# Patient Record
Sex: FEMALE | Race: WHITE | NOT HISPANIC OR LATINO | Employment: OTHER | URBAN - NONMETROPOLITAN AREA
[De-identification: names, ages, dates, MRNs, and addresses within clinical notes are randomized per-mention and may not be internally consistent; named-entity substitution may affect disease eponyms.]

---

## 2017-05-11 ENCOUNTER — OFFICE VISIT (OUTPATIENT)
Dept: GASTROENTEROLOGY | Facility: CLINIC | Age: 57
End: 2017-05-11

## 2017-05-11 VITALS
HEIGHT: 66 IN | SYSTOLIC BLOOD PRESSURE: 121 MMHG | WEIGHT: 121.6 LBS | DIASTOLIC BLOOD PRESSURE: 80 MMHG | BODY MASS INDEX: 19.54 KG/M2 | HEART RATE: 82 BPM

## 2017-05-11 DIAGNOSIS — B18.2 CHRONIC HEPATITIS C WITHOUT HEPATIC COMA (HCC): Primary | ICD-10-CM

## 2017-05-11 PROBLEM — E78.70: Status: ACTIVE | Noted: 2017-05-11

## 2017-05-11 PROBLEM — E87.20 ACIDOSIS: Status: ACTIVE | Noted: 2017-05-11

## 2017-05-11 PROCEDURE — 99203 OFFICE O/P NEW LOW 30 MIN: CPT | Performed by: INTERNAL MEDICINE

## 2017-05-11 RX ORDER — METHADONE HYDROCHLORIDE 10 MG/1
TABLET ORAL
Refills: 0 | COMMUNITY
Start: 2017-05-06 | End: 2017-12-05 | Stop reason: HOSPADM

## 2017-05-11 RX ORDER — LIDOCAINE HYDROCHLORIDE 10 MG/ML
0.1 INJECTION, SOLUTION EPIDURAL; INFILTRATION; INTRACAUDAL; PERINEURAL ONCE AS NEEDED
Status: CANCELLED | OUTPATIENT
Start: 2017-06-26

## 2017-05-11 RX ORDER — GABAPENTIN 600 MG/1
TABLET ORAL
Refills: 2 | COMMUNITY
Start: 2017-04-27 | End: 2017-12-05 | Stop reason: HOSPADM

## 2017-05-11 RX ORDER — DICYCLOMINE HYDROCHLORIDE 10 MG/1
CAPSULE ORAL
Refills: 0 | COMMUNITY
Start: 2017-04-06 | End: 2018-01-01 | Stop reason: SDUPTHER

## 2017-05-11 RX ORDER — SODIUM CHLORIDE 0.9 % (FLUSH) 0.9 %
1-10 SYRINGE (ML) INJECTION AS NEEDED
Status: CANCELLED | OUTPATIENT
Start: 2017-06-26

## 2017-05-11 RX ORDER — TIZANIDINE 4 MG/1
TABLET ORAL
Refills: 2 | COMMUNITY
Start: 2017-03-11 | End: 2018-01-01 | Stop reason: HOSPADM

## 2017-05-11 RX ORDER — SODIUM, POTASSIUM,MAG SULFATES 17.5-3.13G
1 SOLUTION, RECONSTITUTED, ORAL ORAL EVERY 12 HOURS
Qty: 1 BOTTLE | Refills: 0 | Status: ON HOLD | OUTPATIENT
Start: 2017-05-11 | End: 2017-06-26

## 2017-05-11 RX ORDER — PROMETHAZINE HYDROCHLORIDE 25 MG/1
TABLET ORAL
Refills: 0 | COMMUNITY
Start: 2017-04-06 | End: 2018-01-01

## 2017-05-11 RX ORDER — CLONAZEPAM 0.5 MG/1
TABLET ORAL
Refills: 0 | COMMUNITY
Start: 2017-04-22 | End: 2017-12-05 | Stop reason: HOSPADM

## 2017-05-11 RX ORDER — DEXTROSE AND SODIUM CHLORIDE 5; .45 G/100ML; G/100ML
30 INJECTION, SOLUTION INTRAVENOUS CONTINUOUS PRN
Status: CANCELLED | OUTPATIENT
Start: 2017-06-26

## 2017-05-11 RX ORDER — RANITIDINE 150 MG/1
TABLET ORAL
Refills: 5 | COMMUNITY
Start: 2017-02-07 | End: 2017-06-19

## 2017-05-15 ENCOUNTER — TELEPHONE (OUTPATIENT)
Dept: CARDIOLOGY | Facility: CLINIC | Age: 57
End: 2017-05-15

## 2017-05-19 ENCOUNTER — TELEPHONE (OUTPATIENT)
Dept: GASTROENTEROLOGY | Facility: CLINIC | Age: 57
End: 2017-05-19

## 2017-05-22 ENCOUNTER — TELEPHONE (OUTPATIENT)
Dept: CARDIOLOGY | Facility: CLINIC | Age: 57
End: 2017-05-22

## 2017-06-19 RX ORDER — ESOMEPRAZOLE MAGNESIUM 40 MG/1
40 CAPSULE, DELAYED RELEASE ORAL
COMMUNITY
End: 2018-01-01 | Stop reason: SDUPTHER

## 2017-06-26 ENCOUNTER — HOSPITAL ENCOUNTER (OUTPATIENT)
Facility: HOSPITAL | Age: 57
Setting detail: HOSPITAL OUTPATIENT SURGERY
Discharge: HOME | End: 2017-06-26
Attending: INTERNAL MEDICINE | Admitting: INTERNAL MEDICINE

## 2017-06-26 ENCOUNTER — ANESTHESIA (OUTPATIENT)
Dept: GASTROENTEROLOGY | Facility: HOSPITAL | Age: 57
End: 2017-06-26

## 2017-06-26 ENCOUNTER — ANESTHESIA EVENT (OUTPATIENT)
Dept: GASTROENTEROLOGY | Facility: HOSPITAL | Age: 57
End: 2017-06-26

## 2017-06-26 VITALS
HEIGHT: 66 IN | HEART RATE: 96 BPM | RESPIRATION RATE: 18 BRPM | OXYGEN SATURATION: 97 % | WEIGHT: 121.25 LBS | BODY MASS INDEX: 19.49 KG/M2 | TEMPERATURE: 97.6 F | SYSTOLIC BLOOD PRESSURE: 112 MMHG | DIASTOLIC BLOOD PRESSURE: 80 MMHG

## 2017-06-26 DIAGNOSIS — B18.2 CHRONIC HEPATITIS C WITHOUT HEPATIC COMA (HCC): ICD-10-CM

## 2017-06-26 PROCEDURE — G0121 COLON CA SCRN NOT HI RSK IND: HCPCS | Performed by: INTERNAL MEDICINE

## 2017-06-26 PROCEDURE — 25010000002 MIDAZOLAM PER 1 MG: Performed by: NURSE ANESTHETIST, CERTIFIED REGISTERED

## 2017-06-26 PROCEDURE — 25010000002 PROPOFOL 10 MG/ML EMULSION: Performed by: NURSE ANESTHETIST, CERTIFIED REGISTERED

## 2017-06-26 PROCEDURE — 25010000002 FENTANYL CITRATE (PF) 100 MCG/2ML SOLUTION: Performed by: NURSE ANESTHETIST, CERTIFIED REGISTERED

## 2017-06-26 PROCEDURE — 43239 EGD BIOPSY SINGLE/MULTIPLE: CPT | Performed by: INTERNAL MEDICINE

## 2017-06-26 PROCEDURE — 88305 TISSUE EXAM BY PATHOLOGIST: CPT | Performed by: PATHOLOGY

## 2017-06-26 PROCEDURE — 88305 TISSUE EXAM BY PATHOLOGIST: CPT | Performed by: INTERNAL MEDICINE

## 2017-06-26 RX ORDER — FENTANYL CITRATE 50 UG/ML
INJECTION, SOLUTION INTRAMUSCULAR; INTRAVENOUS AS NEEDED
Status: DISCONTINUED | OUTPATIENT
Start: 2017-06-26 | End: 2017-06-26 | Stop reason: SURG

## 2017-06-26 RX ORDER — PROMETHAZINE HYDROCHLORIDE 25 MG/ML
12.5 INJECTION, SOLUTION INTRAMUSCULAR; INTRAVENOUS ONCE AS NEEDED
Status: DISCONTINUED | OUTPATIENT
Start: 2017-06-26 | End: 2017-06-26 | Stop reason: HOSPADM

## 2017-06-26 RX ORDER — PROPOFOL 10 MG/ML
VIAL (ML) INTRAVENOUS AS NEEDED
Status: DISCONTINUED | OUTPATIENT
Start: 2017-06-26 | End: 2017-06-26 | Stop reason: SURG

## 2017-06-26 RX ORDER — PROMETHAZINE HYDROCHLORIDE 25 MG/1
25 TABLET ORAL ONCE AS NEEDED
Status: DISCONTINUED | OUTPATIENT
Start: 2017-06-26 | End: 2017-06-26 | Stop reason: HOSPADM

## 2017-06-26 RX ORDER — MIDAZOLAM HYDROCHLORIDE 1 MG/ML
INJECTION INTRAMUSCULAR; INTRAVENOUS AS NEEDED
Status: DISCONTINUED | OUTPATIENT
Start: 2017-06-26 | End: 2017-06-26 | Stop reason: SURG

## 2017-06-26 RX ORDER — DEXAMETHASONE SODIUM PHOSPHATE 4 MG/ML
8 INJECTION, SOLUTION INTRA-ARTICULAR; INTRALESIONAL; INTRAMUSCULAR; INTRAVENOUS; SOFT TISSUE ONCE AS NEEDED
Status: DISCONTINUED | OUTPATIENT
Start: 2017-06-26 | End: 2017-06-26 | Stop reason: HOSPADM

## 2017-06-26 RX ORDER — PROMETHAZINE HYDROCHLORIDE 25 MG/1
25 SUPPOSITORY RECTAL ONCE AS NEEDED
Status: DISCONTINUED | OUTPATIENT
Start: 2017-06-26 | End: 2017-06-26 | Stop reason: HOSPADM

## 2017-06-26 RX ORDER — LIDOCAINE HYDROCHLORIDE 10 MG/ML
INJECTION, SOLUTION INFILTRATION; PERINEURAL AS NEEDED
Status: DISCONTINUED | OUTPATIENT
Start: 2017-06-26 | End: 2017-06-26 | Stop reason: SURG

## 2017-06-26 RX ORDER — ONDANSETRON 2 MG/ML
4 INJECTION INTRAMUSCULAR; INTRAVENOUS ONCE AS NEEDED
Status: DISCONTINUED | OUTPATIENT
Start: 2017-06-26 | End: 2017-06-26 | Stop reason: HOSPADM

## 2017-06-26 RX ORDER — DEXTROSE AND SODIUM CHLORIDE 5; .45 G/100ML; G/100ML
30 INJECTION, SOLUTION INTRAVENOUS CONTINUOUS PRN
Status: DISCONTINUED | OUTPATIENT
Start: 2017-06-26 | End: 2017-06-26 | Stop reason: HOSPADM

## 2017-06-26 RX ADMIN — LIDOCAINE HYDROCHLORIDE 60 MG: 10 INJECTION, SOLUTION INFILTRATION; PERINEURAL at 13:37

## 2017-06-26 RX ADMIN — PROPOFOL 50 MG: 10 INJECTION, EMULSION INTRAVENOUS at 13:43

## 2017-06-26 RX ADMIN — PROPOFOL 100 MG: 10 INJECTION, EMULSION INTRAVENOUS at 13:40

## 2017-06-26 RX ADMIN — MIDAZOLAM 2 MG: 1 INJECTION INTRAMUSCULAR; INTRAVENOUS at 13:33

## 2017-06-26 RX ADMIN — FENTANYL CITRATE 25 MCG: 50 INJECTION, SOLUTION INTRAMUSCULAR; INTRAVENOUS at 13:44

## 2017-06-26 RX ADMIN — PROPOFOL 20 MG: 10 INJECTION, EMULSION INTRAVENOUS at 13:50

## 2017-06-26 RX ADMIN — FENTANYL CITRATE 50 MCG: 50 INJECTION, SOLUTION INTRAMUSCULAR; INTRAVENOUS at 13:36

## 2017-06-26 RX ADMIN — DEXTROSE AND SODIUM CHLORIDE 30 ML/HR: 5; 450 INJECTION, SOLUTION INTRAVENOUS at 13:28

## 2017-06-26 RX ADMIN — PROPOFOL 50 MG: 10 INJECTION, EMULSION INTRAVENOUS at 13:47

## 2017-06-26 RX ADMIN — FENTANYL CITRATE 25 MCG: 50 INJECTION, SOLUTION INTRAMUSCULAR; INTRAVENOUS at 13:50

## 2017-06-26 NOTE — PLAN OF CARE
Problem: Patient Care Overview (Adult)  Goal: Plan of Care Review  Outcome: Outcome(s) achieved Date Met:  06/26/17 06/26/17 1431   Coping/Psychosocial Response Interventions   Plan Of Care Reviewed With patient   Patient Care Overview   Progress no change   Outcome Evaluation   Outcome Summary/Follow up Plan ready for d/c         Problem: GI Endoscopy (Adult)  Goal: Signs and Symptoms of Listed Potential Problems Will be Absent or Manageable (GI Endoscopy)  Outcome: Outcome(s) achieved Date Met:  06/26/17 06/26/17 1431   GI Endoscopy   Problems Assessed (GI Endoscopy) all   Problems Present (GI Endoscopy) none

## 2017-06-26 NOTE — ANESTHESIA PREPROCEDURE EVALUATION
Anesthesia Evaluation     Patient summary reviewed and Nursing notes reviewed   NPO Solid Status: > 8 hours  NPO Liquid Status: > 6 hours     Airway   Mallampati: II  TM distance: >3 FB  Neck ROM: full  no difficulty expected  Dental    (+) upper dentures and lower dentures    Pulmonary - normal exam   (+) a smoker Current, COPD, asthma,   Cardiovascular - normal exam    (+) CAD,       Neuro/Psych  (+) seizures,    GI/Hepatic/Renal/Endo    (+)  hepatitis C, liver disease,     Musculoskeletal (-) negative ROS    Abdominal  - normal exam   Substance History - negative use     OB/GYN negative ob/gyn ROS         Other - negative ROS                                     Anesthesia Plan    ASA 3     MAC     intravenous induction   Anesthetic plan and risks discussed with patient.

## 2017-06-26 NOTE — PLAN OF CARE
Problem: Patient Care Overview (Adult)  Goal: Plan of Care Review    06/26/17 1359   Coping/Psychosocial Response Interventions   Plan Of Care Reviewed With patient   Patient Care Overview   Progress no change   Outcome Evaluation   Outcome Summary/Follow up Plan vss         Problem: GI Endoscopy (Adult)  Goal: Signs and Symptoms of Listed Potential Problems Will be Absent or Manageable (GI Endoscopy)    06/26/17 9847   GI Endoscopy   Problems Assessed (GI Endoscopy) all   Problems Present (GI Endoscopy) none

## 2017-06-26 NOTE — ANESTHESIA POSTPROCEDURE EVALUATION
Patient: Guillermina Noble    Procedure Summary     Date Anesthesia Start Anesthesia Stop Room / Location    06/26/17 [id number] [facility] ENDOSCOPY 1 / [facility] ENDOSCOPY       Procedure Diagnosis Surgeon Provider    ESOPHAGOGASTRODUODENOSCOPY (N/A Esophagus); COLONOSCOPY (N/A ) Chronic hepatitis C without hepatic coma  (Chronic hepatitis C without hepatic coma [B18.2]) MD Adilene Beaver, COREY          Anesthesia Type: MAC  Last vitals  /98 (06/26/17 1309)    Temp 98.1 °F (36.7 °C) (06/26/17 1309)    Pulse (!) 126 (06/26/17 1309)   Resp 20 (06/26/17 1309)    SpO2 97 % (06/26/17 1309)      Post Anesthesia Care and Evaluation    Patient location during evaluation: bedside  Patient participation: complete - patient participated  Level of consciousness: awake and awake and alert  Pain score: 0  Pain management: satisfactory to patient  Airway patency: patent  Anesthetic complications: No anesthetic complications  PONV Status: none  Cardiovascular status: acceptable and stable  Respiratory status: acceptable, room air, unassisted and spontaneous ventilation  Hydration status: acceptable

## 2017-06-26 NOTE — H&P
Progress Notes  Encounter Date: 6/26/2017  Jorge Lugo MD   Gastroenterology   Expand All Collapse All    []Hide copied text  []Zan for attribution information  [facility]        Chief Complaint:        Chief Complaint   Patient presents with   • Abdominal Pain       Ref J herberth   • Hepatitis C   • Nausea   • Cirrhosis            Subjective      HPI:   Patient with hepatitis C and cirrhosis. Patient has not been treated for this in the past. Patient was told that she had very advanced disease many years ago. Patient having abdominal pain some epigastric and some right upper quadrant abdominal pain patient has not had a colonoscopy.     Plan; we'll schedule patient for EGD and colonoscopy will schedule patient for lab work for hepatitis C while patient follow up following these procedures.     Past Medical History:    Medical History         Past Medical History:   Diagnosis Date   • COPD (chronic obstructive pulmonary disease)     • Emphysema/COPD     • Multiple allergies              Family History:        Family History   Problem Relation Age of Onset   • Lung disease Mother     • Lung cancer Father     • Diabetes Sister     • Cirrhosis Brother     • Liver disease Brother     • Heart disease Maternal Grandmother     • Heart disease Maternal Grandfather     • Heart disease Paternal Grandmother     • Heart disease Paternal Grandfather           Social History:   reports that she has been smoking Cigarettes. She has been smoking about 1.00 pack per day. She does not have any smokeless tobacco history on file. She reports that she does not drink alcohol or use illicit drugs.     Medications:    Current Medications           Current Outpatient Prescriptions   Medication Sig Dispense Refill   • clonazePAM (KlonoPIN) 0.5 MG tablet TAKE 1 TABLET BY MOUTH DAILY AS NEEDED FOR ANXIETY   0   • dicyclomine (BENTYL) 10 MG capsule TAKE 1 CAPSULE BY MOUTH THREE TIMES DAILY   0   • gabapentin  "(NEURONTIN) 600 MG tablet TAKE 1 TABLET 3 TIMES PER DAY   2   • methadone (DOLOPHINE) 10 MG tablet TAKE 1 TABLET BY MOUTH THREE TIMES A DAY; FILL ON OR AFTER 05/06/2017   0   • promethazine (PHENERGAN) 25 MG tablet TAKE 1/2 - 1 TABLET BY MOUTH TWICE DAILY AS NEEDED FOR NAUSEA   0   • raNITIdine (ZANTAC) 150 MG tablet TAKE 1 TABLET 2 TIMES PER DAY   5   • tiZANidine (ZANAFLEX) 4 MG tablet TAKE 1 TABLET BY MOUTH TWICE DAILY AS NEEDED   2   • sodium-potassium-magnesium sulfates (SUPREP) solution oral solution Take 1 bottle by mouth Every 12 (Twelve) Hours. 1 bottle 0      No current facility-administered medications for this visit.             Allergies:  Codeine; Iodine; Keflex [cephalexin]; and Sudafed [pseudoephedrine hcl]     ROS:   Review of Systems   Constitutional: Negative for activity change, appetite change, chills, diaphoresis, fatigue, fever and unexpected weight change.   HENT: Negative for sore throat and trouble swallowing.   Respiratory: Negative for shortness of breath.   Gastrointestinal: Positive for abdominal pain and constipation. Negative for abdominal distention, anal bleeding, blood in stool, diarrhea, nausea, rectal pain and vomiting.   Musculoskeletal: Negative for arthralgias.   Skin: Negative for pallor.   Neurological: Negative for light-headedness.         Objective          Blood pressure 121/80, pulse 82, height 66\" (167.6 cm), weight 121 lb 9.6 oz (55.2 kg).     Physical Exam   Constitutional: She is oriented to person, place, and time. No distress.   HENT:   Head: Normocephalic and atraumatic.   Cardiovascular: Normal rate, regular rhythm, normal heart sounds and intact distal pulses. Exam reveals no gallop and no friction rub.   No murmur heard.  Pulmonary/Chest: Breath sounds normal. No respiratory distress. She has no wheezes. She has no rales. She exhibits no tenderness.   Abdominal: Soft. Bowel sounds are normal. She exhibits no distension and no mass. There is tenderness. There " is no rebound and no guarding. No hernia.   Musculoskeletal: Normal range of motion. She exhibits no edema.   Neurological: She is alert and oriented to person, place, and time.   Skin: Skin is warm and dry. No rash noted. She is not diaphoretic. No erythema. No pallor.   Psychiatric: She has a normal mood and affect. Her behavior is normal. Judgment and thought content normal.            Assessment/Plan       Guillermina was seen today for abdominal pain, hepatitis c, nausea and cirrhosis.     Diagnoses and all orders for this visit:     Chronic hepatitis C without hepatic coma  - AFP Tumor Marker  - CBC & Differential  - Comprehensive Metabolic Panel  - Ethanol  - HCV FibroSURE  - HCV NS5A Drug Resistance Assay; Future  - Hepatitis C Genotype; Future  - Hepatitis C RNA, Quantitative, PCR (graph)  - Urine Drug Screen  - US Abdomen Complete  - Hepatitis B & C Profile  - Case Request; Standing  - sodium chloride 0.9 % flush 1-10 mL; Infuse 1-10 mL into a venous catheter As Needed for Line Care.  - lidocaine PF 1% (XYLOCAINE) injection 0.1 mL; Inject 0.1 mL into the skin 1 (One) Time As Needed (for IV access).  - dextrose 5 % and sodium chloride 0.45 % infusion; Infuse 30 mL/hr into a venous catheter Continuous As Needed (Start Prior to Procedure).  - Case Request     Other orders  - Implement Anesthesia Orders Day of Procedure; Standing  - Obtain Informed Consent; Standing  - Verify Informed Consent; Standing  - POC Glucose Fingerstick; Standing  - Insert Peripheral IV; Standing  - Saline Lock & Maintain IV Access; Standing  - sodium-potassium-magnesium sulfates (SUPREP) solution oral solution; Take 1 bottle by mouth Every 12 (Twelve) Hours.           ESOPHAGOGASTRODUODENOSCOPY (N/A), COLONOSCOPY (N/A)       Diagnosis Plan   1. Chronic hepatitis C without hepatic coma  AFP Tumor Marker     CBC & Differential     Comprehensive Metabolic Panel     Ethanol     HCV FibroSURE     HCV NS5A Drug Resistance Assay     Hepatitis C  Genotype     Hepatitis C RNA, Quantitative, PCR (graph)     Urine Drug Screen     US Abdomen Complete     Hepatitis B & C Profile     Case Request     sodium chloride 0.9 % flush 1-10 mL     lidocaine PF 1% (XYLOCAINE) injection 0.1 mL     dextrose 5 % and sodium chloride 0.45 % infusion     Case Request         Anticipated Surgical Procedure:        Orders Placed This Encounter   Procedures   • US Abdomen Complete       Order Specific Question:  Reason for Exam:       Answer:  hep c   • AFP Tumor Marker   • Comprehensive Metabolic Panel   • Ethanol   • HCV FibroSURE   • HCV NS5A Drug Resistance Assay       Standing Status:  Future       Standing Expiration Date:  5/11/2018   • Hepatitis C Genotype       Standing Status:  Future       Standing Expiration Date:  5/11/2018   • Hepatitis C RNA, Quantitative, PCR (graph)   • Urine Drug Screen   • Hepatitis B & C Profile         The risks, benefits, and alternatives of this procedure have been discussed with the patient or the responsible party- the patient understands and agrees to proceed.                                                                                                           Office Visit on 5/11/2017              Detailed Report

## 2017-06-29 LAB
LAB AP CASE REPORT: NORMAL
Lab: NORMAL
PATH REPORT.FINAL DX SPEC: NORMAL
PATH REPORT.GROSS SPEC: NORMAL

## 2017-07-11 ENCOUNTER — OFFICE VISIT (OUTPATIENT)
Dept: GASTROENTEROLOGY | Facility: CLINIC | Age: 57
End: 2017-07-11

## 2017-07-11 ENCOUNTER — LAB (OUTPATIENT)
Dept: LAB | Facility: HOSPITAL | Age: 57
End: 2017-07-11

## 2017-07-11 VITALS
SYSTOLIC BLOOD PRESSURE: 126 MMHG | HEIGHT: 66 IN | BODY MASS INDEX: 18.48 KG/M2 | DIASTOLIC BLOOD PRESSURE: 82 MMHG | HEART RATE: 89 BPM | WEIGHT: 115 LBS

## 2017-07-11 DIAGNOSIS — K21.9 GASTROESOPHAGEAL REFLUX DISEASE, ESOPHAGITIS PRESENCE NOT SPECIFIED: ICD-10-CM

## 2017-07-11 DIAGNOSIS — B18.2 CHRONIC HEPATITIS C WITHOUT HEPATIC COMA (HCC): Primary | ICD-10-CM

## 2017-07-11 DIAGNOSIS — B18.2 CHRONIC HEPATITIS C WITHOUT HEPATIC COMA (HCC): ICD-10-CM

## 2017-07-11 LAB
ALBUMIN SERPL-MCNC: 4.3 G/DL (ref 3.4–4.8)
ALBUMIN/GLOB SERPL: 1 G/DL (ref 1.1–1.8)
ALP SERPL-CCNC: 135 U/L (ref 38–126)
ALT SERPL W P-5'-P-CCNC: 51 U/L (ref 9–52)
AMPHET+METHAMPHET UR QL: NEGATIVE
ANION GAP SERPL CALCULATED.3IONS-SCNC: 10 MMOL/L (ref 5–15)
AST SERPL-CCNC: 57 U/L (ref 14–36)
BARBITURATES UR QL SCN: NEGATIVE
BASOPHILS # BLD AUTO: 0.02 10*3/MM3 (ref 0–0.2)
BASOPHILS NFR BLD AUTO: 0.3 % (ref 0–2)
BENZODIAZ UR QL SCN: POSITIVE
BILIRUB SERPL-MCNC: 0.8 MG/DL (ref 0.2–1.3)
BUN BLD-MCNC: 13 MG/DL (ref 7–21)
BUN/CREAT SERPL: 19.7 (ref 7–25)
CALCIUM SPEC-SCNC: 9.3 MG/DL (ref 8.4–10.2)
CANNABINOIDS SERPL QL: POSITIVE
CHLORIDE SERPL-SCNC: 103 MMOL/L (ref 95–110)
CO2 SERPL-SCNC: 26 MMOL/L (ref 22–31)
COCAINE UR QL: NEGATIVE
CREAT BLD-MCNC: 0.66 MG/DL (ref 0.5–1)
DEPRECATED RDW RBC AUTO: 46.8 FL (ref 36.4–46.3)
EOSINOPHIL # BLD AUTO: 0.06 10*3/MM3 (ref 0–0.7)
EOSINOPHIL NFR BLD AUTO: 1 % (ref 0–7)
ERYTHROCYTE [DISTWIDTH] IN BLOOD BY AUTOMATED COUNT: 14.2 % (ref 11.5–14.5)
ETHANOL BLD-MCNC: <10 MG/DL (ref 0–10)
ETHANOL UR QL: <0.01 %
GFR SERPL CREATININE-BSD FRML MDRD: 92 ML/MIN/1.73 (ref 60–120)
GLOBULIN UR ELPH-MCNC: 4.4 GM/DL (ref 2.3–3.5)
GLUCOSE BLD-MCNC: 90 MG/DL (ref 60–100)
HCT VFR BLD AUTO: 44.6 % (ref 35–45)
HGB BLD-MCNC: 15.2 G/DL (ref 12–15.5)
IMM GRANULOCYTES # BLD: 0.01 10*3/MM3 (ref 0–0.02)
IMM GRANULOCYTES NFR BLD: 0.2 % (ref 0–0.5)
LYMPHOCYTES # BLD AUTO: 2.12 10*3/MM3 (ref 0.6–4.2)
LYMPHOCYTES NFR BLD AUTO: 36.3 % (ref 10–50)
MCH RBC QN AUTO: 30.7 PG (ref 26.5–34)
MCHC RBC AUTO-ENTMCNC: 34.1 G/DL (ref 31.4–36)
MCV RBC AUTO: 90.1 FL (ref 80–98)
METHADONE UR QL SCN: POSITIVE
MONOCYTES # BLD AUTO: 0.47 10*3/MM3 (ref 0–0.9)
MONOCYTES NFR BLD AUTO: 8 % (ref 0–12)
NEUTROPHILS # BLD AUTO: 3.16 10*3/MM3 (ref 2–8.6)
NEUTROPHILS NFR BLD AUTO: 54.2 % (ref 37–80)
NRBC BLD MANUAL-RTO: 0 /100 WBC (ref 0–0)
OPIATES UR QL: NEGATIVE
OXYCODONE UR QL SCN: NEGATIVE
PLATELET # BLD AUTO: 80 10*3/MM3 (ref 150–450)
PMV BLD AUTO: 11.2 FL (ref 8–12)
POTASSIUM BLD-SCNC: 4.1 MMOL/L (ref 3.5–5.1)
PROT SERPL-MCNC: 8.7 G/DL (ref 6.3–8.6)
RBC # BLD AUTO: 4.95 10*6/MM3 (ref 3.77–5.16)
RBC MORPH BLD: NORMAL
SMALL PLATELETS BLD QL SMEAR: NORMAL
SODIUM BLD-SCNC: 139 MMOL/L (ref 137–145)
WBC MORPH BLD: NORMAL
WBC NRBC COR # BLD: 5.84 10*3/MM3 (ref 3.2–9.8)

## 2017-07-11 PROCEDURE — 82105 ALPHA-FETOPROTEIN SERUM: CPT | Performed by: INTERNAL MEDICINE

## 2017-07-11 PROCEDURE — 83010 ASSAY OF HAPTOGLOBIN QUANT: CPT | Performed by: INTERNAL MEDICINE

## 2017-07-11 PROCEDURE — 87900 PHENOTYPE INFECT AGENT DRUG: CPT

## 2017-07-11 PROCEDURE — 87902 NFCT AGT GNTYP ALYS HEP C: CPT | Performed by: INTERNAL MEDICINE

## 2017-07-11 PROCEDURE — 87522 HEPATITIS C REVRS TRNSCRPJ: CPT | Performed by: INTERNAL MEDICINE

## 2017-07-11 PROCEDURE — 80053 COMPREHEN METABOLIC PANEL: CPT | Performed by: INTERNAL MEDICINE

## 2017-07-11 PROCEDURE — 80307 DRUG TEST PRSMV CHEM ANLYZR: CPT | Performed by: INTERNAL MEDICINE

## 2017-07-11 PROCEDURE — 84460 ALANINE AMINO (ALT) (SGPT): CPT | Performed by: INTERNAL MEDICINE

## 2017-07-11 PROCEDURE — 99213 OFFICE O/P EST LOW 20 MIN: CPT | Performed by: INTERNAL MEDICINE

## 2017-07-11 PROCEDURE — 82977 ASSAY OF GGT: CPT | Performed by: INTERNAL MEDICINE

## 2017-07-11 PROCEDURE — 85007 BL SMEAR W/DIFF WBC COUNT: CPT | Performed by: INTERNAL MEDICINE

## 2017-07-11 PROCEDURE — 85025 COMPLETE CBC W/AUTO DIFF WBC: CPT | Performed by: INTERNAL MEDICINE

## 2017-07-11 PROCEDURE — 87902 NFCT AGT GNTYP ALYS HEP C: CPT

## 2017-07-11 PROCEDURE — 82247 BILIRUBIN TOTAL: CPT | Performed by: INTERNAL MEDICINE

## 2017-07-11 PROCEDURE — 83883 ASSAY NEPHELOMETRY NOT SPEC: CPT | Performed by: INTERNAL MEDICINE

## 2017-07-11 PROCEDURE — 36415 COLL VENOUS BLD VENIPUNCTURE: CPT

## 2017-07-11 RX ORDER — SUCRALFATE ORAL 1 G/10ML
1 SUSPENSION ORAL 4 TIMES DAILY
Qty: 4 G | Refills: 5 | Status: SHIPPED | OUTPATIENT
Start: 2017-07-11 | End: 2018-01-01

## 2017-07-11 NOTE — PROGRESS NOTES
[facility]      Chief Complaint:   Chief Complaint   Patient presents with   • Hepatitis C   • Results   • EGD     follow up from 06/26/2017   • Colonoscopy     follow up from 06/26/2017       Subjective     HPI:   Patient for follow-up on labs from her hepatitis C.  Patient had lab work done in [facility] but they did the incorrect lab work.  Ultrasound the abdomen was normal.  Patient will undergo lab work and follow-up.    Plan; while patient follow up after lab work is completed determine if therapy for hepatitis C is necessary.    Past Medical History:   Past Medical History:   Diagnosis Date   • Asthma    • COPD (chronic obstructive pulmonary disease)    • Coronary artery disease    • Emphysema of lung    • Emphysema/COPD    • Multiple allergies        Family History:  Family History   Problem Relation Age of Onset   • Lung disease Mother    • Lung cancer Father    • Diabetes Sister    • Cirrhosis Brother    • Liver disease Brother    • Heart disease Maternal Grandmother    • Heart disease Maternal Grandfather    • Heart disease Paternal Grandmother    • Heart disease Paternal Grandfather        Social History:   reports that she has been smoking Cigarettes.  She has been smoking about 1.00 pack per day. She has never used smokeless tobacco. She reports that she does not drink alcohol or use illicit drugs.    Medications:   Current Outpatient Prescriptions   Medication Sig Dispense Refill   • clonazePAM (KlonoPIN) 0.5 MG tablet TAKE 1 TABLET BY MOUTH DAILY AS NEEDED FOR ANXIETY  0   • dicyclomine (BENTYL) 10 MG capsule TAKE 1 CAPSULE BY MOUTH THREE TIMES DAILY  0   • esomeprazole (nexIUM) 40 MG capsule Take 40 mg by mouth Every Morning Before Breakfast.     • gabapentin (NEURONTIN) 600 MG tablet TAKE 1 TABLET 3 TIMES PER DAY  2   • methadone (DOLOPHINE) 10 MG tablet TAKE 1 TABLET BY MOUTH THREE TIMES A DAY; FILL ON OR AFTER 05/06/2017  0   • promethazine (PHENERGAN) 25 MG  "tablet TAKE 1/2 - 1 TABLET BY MOUTH TWICE DAILY AS NEEDED FOR NAUSEA  0   • tiZANidine (ZANAFLEX) 4 MG tablet TAKE 1 TABLET BY MOUTH TWICE DAILY AS NEEDED  2   • sucralfate (CARAFATE) 1 GM/10ML suspension Take 10 mL by mouth 4 (Four) Times a Day. 4 g 5     No current facility-administered medications for this visit.        Allergies:  Sudafed [pseudoephedrine hcl]; Codeine; Iodine; and Keflex [cephalexin]    ROS:    Review of Systems   Constitutional: Negative for activity change, appetite change, chills, diaphoresis, fatigue, fever and unexpected weight change.   HENT: Negative for sore throat and trouble swallowing.    Respiratory: Negative for shortness of breath.    Gastrointestinal: Negative for abdominal distention, abdominal pain, anal bleeding, blood in stool, constipation, diarrhea, nausea, rectal pain and vomiting.   Musculoskeletal: Negative for arthralgias.   Skin: Negative for pallor.   Neurological: Negative for light-headedness.     Objective     Blood pressure 126/82, pulse 89, height 66\" (167.6 cm), weight 115 lb (52.2 kg).    Physical Exam   Constitutional: She is oriented to person, place, and time. She appears well-developed and well-nourished. No distress.   HENT:   Head: Normocephalic and atraumatic.   Cardiovascular: Normal rate, regular rhythm, normal heart sounds and intact distal pulses.  Exam reveals no gallop and no friction rub.    No murmur heard.  Pulmonary/Chest: Breath sounds normal. No respiratory distress. She has no wheezes. She has no rales. She exhibits no tenderness.   Abdominal: Soft. Bowel sounds are normal. She exhibits no distension and no mass. There is no tenderness. There is no rebound and no guarding. No hernia.   Musculoskeletal: Normal range of motion. She exhibits no edema.   Neurological: She is alert and oriented to person, place, and time.   Skin: Skin is warm and dry. No rash noted. She is not diaphoretic. No erythema. No pallor.   Psychiatric: She has a normal " mood and affect. Her behavior is normal. Judgment and thought content normal.        Assessment/Plan   Guillermina was seen today for hepatitis c, results, egd and colonoscopy.    Diagnoses and all orders for this visit:    Chronic hepatitis C without hepatic coma  -     AFP Tumor Marker  -     CBC & Differential  -     Comprehensive Metabolic Panel  -     Ethanol  -     HCV FibroSURE  -     HCV NS5A Drug Resistance Assay; Future  -     Hepatitis C RNA, Quantitative, PCR (graph)  -     Hepatitis C Genotype; Future  -     Urine Drug Screen  -     CBC Auto Differential  -     Scan Slide; Future  -     Scan Slide    Gastroesophageal reflux disease, esophagitis presence not specified  -     sucralfate (CARAFATE) 1 GM/10ML suspension; Take 10 mL by mouth 4 (Four) Times a Day.        * Surgery not found *     Diagnosis Plan   1. Chronic hepatitis C without hepatic coma  AFP Tumor Marker    CBC & Differential    Comprehensive Metabolic Panel    Ethanol    HCV FibroSURE    HCV NS5A Drug Resistance Assay    Hepatitis C RNA, Quantitative, PCR (graph)    Hepatitis C Genotype    Urine Drug Screen    CBC Auto Differential    Scan Slide    Scan Slide   2. Gastroesophageal reflux disease, esophagitis presence not specified  sucralfate (CARAFATE) 1 GM/10ML suspension       Anticipated Surgical Procedure:  Orders Placed This Encounter   Procedures   • AFP Tumor Marker   • Comprehensive Metabolic Panel   • Ethanol   • HCV FibroSURE   • HCV NS5A Drug Resistance Assay     Standing Status:   Future     Number of Occurrences:   1     Standing Expiration Date:   7/11/2018   • Hepatitis C RNA, Quantitative, PCR (graph)   • Hepatitis C Genotype     Standing Status:   Future     Number of Occurrences:   1     Standing Expiration Date:   7/11/2018   • Urine Drug Screen   • CBC Auto Differential   • Scan Slide     Standing Status:   Future     Number of Occurrences:   1     Standing Expiration Date:   7/11/2018   • CBC & Differential     Order  Specific Question:   Manual Differential     Answer:   No       The risks, benefits, and alternatives of this procedure have been discussed with the patient or the responsible party- the patient understands and agrees to proceed.

## 2017-07-12 LAB — AFP-TM SERPL-MCNC: 8.2 NG/ML (ref 0–8.3)

## 2017-07-13 LAB
HCV GENTYP SERPL NAA+PROBE: 3
Lab: NORMAL

## 2017-07-14 LAB
A2 MACROGLOB SERPL-MCNC: 537 MG/DL (ref 110–276)
ALT SERPL W P-5'-P-CCNC: 32 IU/L (ref 0–40)
APO A-I SERPL-MCNC: 74 MG/DL (ref 116–209)
BILIRUB SERPL-MCNC: 0.6 MG/DL (ref 0–1.2)
FIBROSIS SCORING:: ABNORMAL
FIBROSIS STAGE SERPL QL: ABNORMAL
GGT SERPL-CCNC: 38 IU/L (ref 0–60)
HAPTOGLOB SERPL-MCNC: 64 MG/DL (ref 34–200)
HCV AB SER QL: ABNORMAL
HCV RNA SERPL NAA+PROBE-ACNC: NORMAL IU/ML
HCV RNA SERPL NAA+PROBE-ACNC: NORMAL IU/ML
HCV RNA SERPL NAA+PROBE-LOG IU: 7.44 LOG10 IU/ML
LABORATORY COMMENT REPORT: ABNORMAL
LIMITATIONS:: ABNORMAL
LIVER FIBR SCORE SERPL CALC.FIBROSURE: 0.89 (ref 0–0.21)
NECROINFLAMM ACTIVITY SCORING:: ABNORMAL
NECROINFLAMMATORY ACT GRADE SERPL QL: ABNORMAL
NECROINFLAMMATORY ACT SCORE SERPL: 0.32 (ref 0–0.17)
TEST INFORMATION: NORMAL

## 2017-07-21 LAB
HCV NS5 MUT DET ISLT GENOTYP: NORMAL
REF LAB TEST METHOD: NORMAL

## 2017-09-05 ENCOUNTER — OFFICE VISIT (OUTPATIENT)
Dept: GASTROENTEROLOGY | Facility: CLINIC | Age: 57
End: 2017-09-05

## 2017-09-05 VITALS
BODY MASS INDEX: 19.13 KG/M2 | SYSTOLIC BLOOD PRESSURE: 147 MMHG | HEART RATE: 80 BPM | WEIGHT: 119 LBS | DIASTOLIC BLOOD PRESSURE: 86 MMHG | HEIGHT: 66 IN

## 2017-09-05 DIAGNOSIS — B18.2 CHRONIC HEPATITIS C WITHOUT HEPATIC COMA (HCC): Primary | ICD-10-CM

## 2017-09-05 PROCEDURE — 99214 OFFICE O/P EST MOD 30 MIN: CPT | Performed by: INTERNAL MEDICINE

## 2017-09-05 RX ORDER — VELPATASVIR AND SOFOSBUVIR 100; 400 MG/1; MG/1
1 TABLET, FILM COATED ORAL DAILY
Qty: 30 TABLET | Refills: 2 | Status: SHIPPED | OUTPATIENT
Start: 2017-09-05 | End: 2018-01-01

## 2017-09-05 NOTE — PROGRESS NOTES
[facility]      Chief Complaint:   Chief Complaint   Patient presents with   • Hepatitis C     follow up   • Abdominal Pain       Subjective     HPI:   Patient with hepatitis C genotype 3.  Patient has cirrhosis of the liver and needs treatment for her hepatitis C.    Plan; we'll start patient on epclusa 1 tablet daily for 3 months.  We'll patient follow-up in 3 months with repeat lab work.    Past Medical History:   Past Medical History:   Diagnosis Date   • Asthma    • COPD (chronic obstructive pulmonary disease)    • Coronary artery disease    • Emphysema of lung    • Emphysema/COPD    • Multiple allergies        Family History:  Family History   Problem Relation Age of Onset   • Lung disease Mother    • Lung cancer Father    • Diabetes Sister    • Cirrhosis Brother    • Liver disease Brother    • Heart disease Maternal Grandmother    • Heart disease Maternal Grandfather    • Heart disease Paternal Grandmother    • Heart disease Paternal Grandfather        Social History:   reports that she has been smoking Cigarettes.  She has been smoking about 1.00 pack per day. She has never used smokeless tobacco. She reports that she does not drink alcohol or use illicit drugs.    Medications:   Current Outpatient Prescriptions   Medication Sig Dispense Refill   • clonazePAM (KlonoPIN) 0.5 MG tablet TAKE 1 TABLET BY MOUTH DAILY AS NEEDED FOR ANXIETY  0   • dicyclomine (BENTYL) 10 MG capsule TAKE 1 CAPSULE BY MOUTH THREE TIMES DAILY  0   • esomeprazole (nexIUM) 40 MG capsule Take 40 mg by mouth Every Morning Before Breakfast.     • gabapentin (NEURONTIN) 600 MG tablet TAKE 1 TABLET 3 TIMES PER DAY  2   • methadone (DOLOPHINE) 10 MG tablet TAKE 1 TABLET BY MOUTH THREE TIMES A DAY; FILL ON OR AFTER 05/06/2017  0   • promethazine (PHENERGAN) 25 MG tablet TAKE 1/2 - 1 TABLET BY MOUTH TWICE DAILY AS NEEDED FOR NAUSEA  0   • sucralfate (CARAFATE) 1 GM/10ML suspension Take 10 mL by mouth 4 (Four) Times a  "Day. 4 g 5   • tiZANidine (ZANAFLEX) 4 MG tablet TAKE 1 TABLET BY MOUTH TWICE DAILY AS NEEDED  2   • Sofosbuvir-Velpatasvir 400-100 MG tablet Take 1 tablet by mouth Daily. 30 tablet 2     No current facility-administered medications for this visit.        Allergies:  Sudafed [pseudoephedrine hcl]; Codeine; Iodine; and Keflex [cephalexin]    ROS:    Review of Systems   Constitutional: Negative for activity change, appetite change, chills, diaphoresis, fatigue, fever and unexpected weight change.   HENT: Negative for sore throat and trouble swallowing.    Respiratory: Negative for shortness of breath.    Gastrointestinal: Negative for abdominal distention, abdominal pain, anal bleeding, blood in stool, constipation, diarrhea, nausea, rectal pain and vomiting.   Musculoskeletal: Negative for arthralgias.   Skin: Negative for pallor.   Neurological: Negative for light-headedness.     Objective     Blood pressure 147/86, pulse 80, height 66\" (167.6 cm), weight 119 lb (54 kg).    Physical Exam   Constitutional: She is oriented to person, place, and time. She appears well-developed and well-nourished. No distress.   HENT:   Head: Normocephalic and atraumatic.   Cardiovascular: Normal rate, regular rhythm, normal heart sounds and intact distal pulses.  Exam reveals no gallop and no friction rub.    No murmur heard.  Pulmonary/Chest: Breath sounds normal. No respiratory distress. She has no wheezes. She has no rales. She exhibits no tenderness.   Abdominal: Soft. Bowel sounds are normal. She exhibits no distension and no mass. There is no tenderness. There is no rebound and no guarding. No hernia.   Musculoskeletal: Normal range of motion. She exhibits no edema.   Neurological: She is alert and oriented to person, place, and time.   Skin: Skin is warm and dry. No rash noted. She is not diaphoretic. No erythema. No pallor.   Psychiatric: She has a normal mood and affect. Her behavior is normal. Judgment and thought content " normal.        Assessment/Plan   Guillermina was seen today for hepatitis c and abdominal pain.    Diagnoses and all orders for this visit:    Chronic hepatitis C without hepatic coma  -     CBC & Differential  -     Comprehensive Metabolic Panel  -     Hepatitis C RNA, Quantitative, PCR (graph)    Other orders  -     Sofosbuvir-Velpatasvir 400-100 MG tablet; Take 1 tablet by mouth Daily.        * Surgery not found *     Diagnosis Plan   1. Chronic hepatitis C without hepatic coma  CBC & Differential    Comprehensive Metabolic Panel    Hepatitis C RNA, Quantitative, PCR (graph)       Anticipated Surgical Procedure:  Orders Placed This Encounter   Procedures   • Comprehensive Metabolic Panel   • Hepatitis C RNA, Quantitative, PCR (graph)   • CBC & Differential     Order Specific Question:   Manual Differential     Answer:   No       The risks, benefits, and alternatives of this procedure have been discussed with the patient or the responsible party- the patient understands and agrees to proceed.

## 2017-09-20 ENCOUNTER — DOCUMENTATION (OUTPATIENT)
Dept: GASTROENTEROLOGY | Facility: CLINIC | Age: 57
End: 2017-09-20

## 2017-09-20 NOTE — PROGRESS NOTES
"09/20/2017, Victor Hugo Mo with [facility] telephoned this staff member 09/19/2017 at 1017 submitting voice message to return telephone call at earliest convenience regarding patient Guillermina Noble, 1960, prescription medication Epclusa.  Telephone call is returned per this staff member [phone], extension [phone].  Chely stated prescription medication Epclusa was currently being filled per [facility] with prescription medication currently being denied as renewal request deemed \"to soon\".  Chely stated she had spoken with patient and made patient aware of \"to soon\" prescription medication renewal request.  Chely also stated she had instructed patient she may seek future prescription medication renewals with [facility] verses [facility].   "

## 2017-09-22 ENCOUNTER — DOCUMENTATION (OUTPATIENT)
Dept: GASTROENTEROLOGY | Facility: CLINIC | Age: 57
End: 2017-09-22

## 2017-09-22 NOTE — PROGRESS NOTES
09/22/2017, 1010 - Patient's daughter, Guero Martin, telephoned stating patient, Guillermina Noble, 1960, had received prescription medication Epclusa via mail.  Daughter instructed patient to utilize 1 tablet by mouth daily X 3 months as ordered per Dr. Arreaga during prior clinical appointment 09/05/2017 with 3 month clinical appointment with Dr. Arreaga scheduled Tuesday, December 05, 2017 at 2:30 p.m. with laboratory values to be completed 1 week prior.  Daughter verbalized understanding.

## 2017-10-13 ENCOUNTER — TELEPHONE (OUTPATIENT)
Dept: GASTROENTEROLOGY | Facility: CLINIC | Age: 57
End: 2017-10-13

## 2017-10-13 NOTE — TELEPHONE ENCOUNTER
"10/13/2017, 0853 - Patient telephoned per this staff member [phone].  Patient notified of telephone contact form [facility] representative, \"Otilio\" 10/11/2017, 4:02 p.m., [phone], regarding patient prescription medication renewal request - Epclusa 400 / 100 MG, 1 tablet by mouth daily X 12 weeks - being requested to soon (per Otilio, patient should have adequate prescription medication until 10/18/2017 if taken correctly), and patient statement on approximately 2 - 3 occasions, more than 1 tablet had been taken by mouth daily.  Patient education reinforced per this staff member to ensure only 1 tablet is taken by mouth daily.  Patient verbalized understanding.  "

## 2017-11-06 ENCOUNTER — TELEPHONE (OUTPATIENT)
Dept: GASTROENTEROLOGY | Facility: CLINIC | Age: 57
End: 2017-11-06

## 2017-11-06 NOTE — TELEPHONE ENCOUNTER
Patient is calling office stating that after she takes her hep C medication sometimes gives her headaches and chest pain. Patient is informed that headaches are common with taking hep c medication. Patient explains that she has been dealing with a cold for the past week and says that she does not feel that her right chest pain is not severe enough to go to walk in clinic. Patient is informed that if pain worsens or persist to seek emergency medical attention.

## 2017-11-21 ENCOUNTER — TELEPHONE (OUTPATIENT)
Dept: GASTROENTEROLOGY | Facility: CLINIC | Age: 57
End: 2017-11-21

## 2017-11-21 NOTE — TELEPHONE ENCOUNTER
Patient phone call is returned. Patient states that she is in pain and is on her last month of hep c treatment. She states that she is having having some belly pain and nausea with her hep c treatment. She is wondering if this is normal. Patient is informed that with treatment of hep c some belly pain and nausea is normal. Patient is informed that after speaking with  that if her pain and nausea gets to the point she cannot handle it to seek emergency medical attention. Patient agrees.          ----- Message from Mel Pelletier sent at 11/21/2017 10:53 AM CST -----  Please call pt, is ill and would like to speak with you

## 2017-12-05 ENCOUNTER — OFFICE VISIT (OUTPATIENT)
Dept: GASTROENTEROLOGY | Facility: CLINIC | Age: 57
End: 2017-12-05

## 2017-12-05 ENCOUNTER — HOSPITAL ENCOUNTER (OUTPATIENT)
Dept: CT IMAGING | Facility: HOSPITAL | Age: 57
Discharge: HOME | End: 2017-12-05
Admitting: INTERNAL MEDICINE

## 2017-12-05 VITALS
OXYGEN SATURATION: 98 % | HEIGHT: 66 IN | WEIGHT: 122.2 LBS | BODY MASS INDEX: 19.64 KG/M2 | SYSTOLIC BLOOD PRESSURE: 114 MMHG | HEART RATE: 81 BPM | DIASTOLIC BLOOD PRESSURE: 62 MMHG

## 2017-12-05 DIAGNOSIS — R10.13 EPIGASTRIC PAIN: Primary | ICD-10-CM

## 2017-12-05 PROCEDURE — 99213 OFFICE O/P EST LOW 20 MIN: CPT | Performed by: INTERNAL MEDICINE

## 2017-12-05 PROCEDURE — 74176 CT ABD & PELVIS W/O CONTRAST: CPT

## 2017-12-05 NOTE — PROGRESS NOTES
[facility]      Chief Complaint:   Chief Complaint   Patient presents with   • Abdominal Pain   • Hepatitis C     3 month follow up       Subjective     HPI:   Patient with hepatitis C.  Patient has finished treatment for hepatitis C patients been complaining of abdominal pain patient was seen in the emergency room a few days ago and was found to have a platelet count 20.  Patient had follow-up labs including a repeat of her hepatitis C but has not come back yet.    Plan; we'll have patient come back in 2 days with a CT scan of the abdomen and pelvis with by mouth and IV contrast will have patient lives back by that time to evaluate her status of her hepatitis C.    Past Medical History:   Past Medical History:   Diagnosis Date   • Asthma    • COPD (chronic obstructive pulmonary disease)    • Coronary artery disease    • Emphysema of lung    • Emphysema/COPD    • Multiple allergies        Family History:  Family History   Problem Relation Age of Onset   • Lung disease Mother    • Lung cancer Father    • Diabetes Sister    • Cirrhosis Brother    • Liver disease Brother    • Heart disease Maternal Grandmother    • Heart disease Maternal Grandfather    • Heart disease Paternal Grandmother    • Heart disease Paternal Grandfather        Social History:   reports that she has been smoking Cigarettes.  She has been smoking about 1.00 pack per day. She has never used smokeless tobacco. She reports that she does not drink alcohol or use illicit drugs.    Medications:   Prior to Admission medications    Medication Sig Start Date End Date Taking? Authorizing Provider   clonazePAM (KlonoPIN) 0.5 MG tablet TAKE 1 TABLET BY MOUTH DAILY AS NEEDED FOR ANXIETY 4/22/17  Yes Historical Provider, MD   dicyclomine (BENTYL) 10 MG capsule TAKE 1 CAPSULE BY MOUTH THREE TIMES DAILY 4/6/17  Yes Historical Provider, MD   esomeprazole (nexIUM) 40 MG capsule Take 40 mg by mouth Every Morning Before Breakfast.   Yes  "Historical Provider, MD   gabapentin (NEURONTIN) 600 MG tablet TAKE 1 TABLET 3 TIMES PER DAY 4/27/17  Yes Historical Provider, MD   methadone (DOLOPHINE) 10 MG tablet TAKE 1 TABLET BY MOUTH THREE TIMES A DAY; FILL ON OR AFTER 05/06/2017 5/6/17  Yes Historical Provider, MD   promethazine (PHENERGAN) 25 MG tablet TAKE 1/2 - 1 TABLET BY MOUTH TWICE DAILY AS NEEDED FOR NAUSEA 4/6/17  Yes Historical Provider, MD   Sofosbuvir-Velpatasvir 400-100 MG tablet Take 1 tablet by mouth Daily. 9/5/17  Yes Jorge Lugo MD   sucralfate (CARAFATE) 1 GM/10ML suspension Take 10 mL by mouth 4 (Four) Times a Day. 7/11/17  Yes Jorge Lugo MD   tiZANidine (ZANAFLEX) 4 MG tablet TAKE 1 TABLET BY MOUTH TWICE DAILY AS NEEDED 3/11/17  Yes Historical Provider, MD       Allergies:  Sudafed [pseudoephedrine hcl]; Codeine; Iodine; and Keflex [cephalexin]    ROS:    Review of Systems   Constitutional: Negative for activity change, appetite change, chills, diaphoresis, fatigue, fever and unexpected weight change.   HENT: Negative for sore throat and trouble swallowing.    Respiratory: Negative for shortness of breath.    Gastrointestinal: Negative for abdominal distention, abdominal pain, anal bleeding, blood in stool, constipation, diarrhea, nausea, rectal pain and vomiting.   Musculoskeletal: Negative for arthralgias.   Skin: Negative for pallor.   Neurological: Negative for light-headedness.     Objective     Blood pressure 114/62, pulse 81, height 167.6 cm (66\"), weight 55.4 kg (122 lb 3.2 oz), SpO2 98 %.    Physical Exam   Constitutional: She is oriented to person, place, and time. She appears well-developed and well-nourished. No distress.   HENT:   Head: Normocephalic and atraumatic.   Cardiovascular: Normal rate, regular rhythm, normal heart sounds and intact distal pulses.  Exam reveals no gallop and no friction rub.    No murmur heard.  Pulmonary/Chest: Breath sounds normal. No respiratory distress. She has no wheezes. She has no " rales. She exhibits no tenderness.   Abdominal: Soft. Bowel sounds are normal. She exhibits no distension and no mass. There is no tenderness. There is no rebound and no guarding. No hernia.   Musculoskeletal: Normal range of motion. She exhibits no edema.   Neurological: She is alert and oriented to person, place, and time.   Skin: Skin is warm and dry. No rash noted. She is not diaphoretic. No erythema. No pallor.   Psychiatric: She has a normal mood and affect. Her behavior is normal. Judgment and thought content normal.        Assessment/Plan   Guillermina was seen today for abdominal pain and hepatitis c.    Diagnoses and all orders for this visit:    Epigastric pain  -     Cancel: CT Abdomen Pelvis With Contrast  -     CT abdomen pelvis wo contrast      * Surgery not found *     Diagnosis Plan   1. Epigastric pain  CT abdomen pelvis wo contrast       Anticipated Surgical Procedure:  Orders Placed This Encounter   Procedures   • CT abdomen pelvis wo contrast     Order Specific Question:   Will Oral Contrast be needed for this procedure?     Answer:   Yes     Order Specific Question:   Patient Pregnant     Answer:   No     Order Specific Question:   Reason for Exam:     Answer:   Epigastric Pain       The risks, benefits, and alternatives of this procedure have been discussed with the patient or the responsible party- the patient understands and agrees to proceed.

## 2017-12-07 ENCOUNTER — OFFICE VISIT (OUTPATIENT)
Dept: GASTROENTEROLOGY | Facility: CLINIC | Age: 57
End: 2017-12-07

## 2017-12-07 ENCOUNTER — LAB (OUTPATIENT)
Dept: LAB | Facility: HOSPITAL | Age: 57
End: 2017-12-07

## 2017-12-07 VITALS
WEIGHT: 122 LBS | HEIGHT: 66 IN | OXYGEN SATURATION: 98 % | DIASTOLIC BLOOD PRESSURE: 68 MMHG | HEART RATE: 80 BPM | BODY MASS INDEX: 19.61 KG/M2 | SYSTOLIC BLOOD PRESSURE: 120 MMHG

## 2017-12-07 DIAGNOSIS — B18.2 CHRONIC HEPATITIS C WITHOUT HEPATIC COMA (HCC): Primary | ICD-10-CM

## 2017-12-07 DIAGNOSIS — B18.2 CHRONIC HEPATITIS C WITHOUT HEPATIC COMA (HCC): ICD-10-CM

## 2017-12-07 LAB
ALBUMIN SERPL-MCNC: 4.3 G/DL (ref 3.4–4.8)
ALBUMIN/GLOB SERPL: 1.1 G/DL (ref 1.1–1.8)
ALP SERPL-CCNC: 116 U/L (ref 38–126)
ALT SERPL W P-5'-P-CCNC: 30 U/L (ref 9–52)
AMPHET+METHAMPHET UR QL: NEGATIVE
ANION GAP SERPL CALCULATED.3IONS-SCNC: 12 MMOL/L (ref 5–15)
AST SERPL-CCNC: 32 U/L (ref 14–36)
BARBITURATES UR QL SCN: NEGATIVE
BENZODIAZ UR QL SCN: NEGATIVE
BILIRUB SERPL-MCNC: 0.4 MG/DL (ref 0.2–1.3)
BUN BLD-MCNC: 12 MG/DL (ref 7–21)
BUN/CREAT SERPL: 18.2 (ref 7–25)
CALCIUM SPEC-SCNC: 9.6 MG/DL (ref 8.4–10.2)
CANNABINOIDS SERPL QL: NEGATIVE
CHLORIDE SERPL-SCNC: 102 MMOL/L (ref 95–110)
CO2 SERPL-SCNC: 25 MMOL/L (ref 22–31)
COCAINE UR QL: NEGATIVE
CREAT BLD-MCNC: 0.66 MG/DL (ref 0.5–1)
ETHANOL BLD-MCNC: <10 MG/DL (ref 0–10)
ETHANOL UR QL: <0.01 %
GFR SERPL CREATININE-BSD FRML MDRD: 92 ML/MIN/1.73 (ref 51–120)
GLOBULIN UR ELPH-MCNC: 3.9 GM/DL (ref 2.3–3.5)
GLUCOSE BLD-MCNC: 107 MG/DL (ref 60–100)
METHADONE UR QL SCN: NEGATIVE
OPIATES UR QL: NEGATIVE
OXYCODONE UR QL SCN: NEGATIVE
POTASSIUM BLD-SCNC: 4.4 MMOL/L (ref 3.5–5.1)
PROT SERPL-MCNC: 8.2 G/DL (ref 6.3–8.6)
SODIUM BLD-SCNC: 139 MMOL/L (ref 137–145)

## 2017-12-07 PROCEDURE — 80307 DRUG TEST PRSMV CHEM ANLYZR: CPT | Performed by: INTERNAL MEDICINE

## 2017-12-07 PROCEDURE — 87902 NFCT AGT GNTYP ALYS HEP C: CPT

## 2017-12-07 PROCEDURE — 80074 ACUTE HEPATITIS PANEL: CPT | Performed by: INTERNAL MEDICINE

## 2017-12-07 PROCEDURE — 87350 HEPATITIS BE AG IA: CPT | Performed by: INTERNAL MEDICINE

## 2017-12-07 PROCEDURE — 82105 ALPHA-FETOPROTEIN SERUM: CPT | Performed by: INTERNAL MEDICINE

## 2017-12-07 PROCEDURE — 83883 ASSAY NEPHELOMETRY NOT SPEC: CPT | Performed by: INTERNAL MEDICINE

## 2017-12-07 PROCEDURE — 86706 HEP B SURFACE ANTIBODY: CPT | Performed by: INTERNAL MEDICINE

## 2017-12-07 PROCEDURE — 87900 PHENOTYPE INFECT AGENT DRUG: CPT

## 2017-12-07 PROCEDURE — 86704 HEP B CORE ANTIBODY TOTAL: CPT | Performed by: INTERNAL MEDICINE

## 2017-12-07 PROCEDURE — 87517 HEPATITIS B DNA QUANT: CPT | Performed by: INTERNAL MEDICINE

## 2017-12-07 PROCEDURE — 36415 COLL VENOUS BLD VENIPUNCTURE: CPT | Performed by: INTERNAL MEDICINE

## 2017-12-07 PROCEDURE — 99213 OFFICE O/P EST LOW 20 MIN: CPT | Performed by: INTERNAL MEDICINE

## 2017-12-07 PROCEDURE — 82247 BILIRUBIN TOTAL: CPT | Performed by: INTERNAL MEDICINE

## 2017-12-07 PROCEDURE — 82977 ASSAY OF GGT: CPT | Performed by: INTERNAL MEDICINE

## 2017-12-07 PROCEDURE — 87522 HEPATITIS C REVRS TRNSCRPJ: CPT | Performed by: INTERNAL MEDICINE

## 2017-12-07 PROCEDURE — 84460 ALANINE AMINO (ALT) (SGPT): CPT | Performed by: INTERNAL MEDICINE

## 2017-12-07 PROCEDURE — 86707 HEPATITIS BE ANTIBODY: CPT | Performed by: INTERNAL MEDICINE

## 2017-12-07 PROCEDURE — 80053 COMPREHEN METABOLIC PANEL: CPT | Performed by: INTERNAL MEDICINE

## 2017-12-07 PROCEDURE — 83010 ASSAY OF HAPTOGLOBIN QUANT: CPT | Performed by: INTERNAL MEDICINE

## 2017-12-07 RX ORDER — BUPRENORPHINE HYDROCHLORIDE, NALOXONE HYDROCHLORIDE 8; 2 MG/1; MG/1
1 FILM, SOLUBLE BUCCAL; SUBLINGUAL DAILY
Refills: 0 | COMMUNITY
Start: 2017-09-29 | End: 2018-01-01 | Stop reason: HOSPADM

## 2017-12-07 RX ORDER — PROMETHAZINE HYDROCHLORIDE 25 MG/1
25 TABLET ORAL EVERY 6 HOURS PRN
Qty: 60 TABLET | Refills: 5 | Status: SHIPPED | OUTPATIENT
Start: 2017-12-07 | End: 2018-01-01 | Stop reason: SDUPTHER

## 2017-12-07 NOTE — PROGRESS NOTES
[facility]      Chief Complaint:   Chief Complaint   Patient presents with   • Abdominal Pain     2 day follow up       Subjective     HPI:   Patient complaining of abdominal pain.  Patient has CT scan which does show hepatomegaly and splenomegaly.  Patient states some episodes of nausea.  Patient has been taking Phenergan with relief of symptoms.    Plan; we'll reorder hepatitis C with labs while patient follow up following these.  We'll give patient Phenergan 25 mg every 6 hours when necessary.    Past Medical History:   Past Medical History:   Diagnosis Date   • Asthma    • COPD (chronic obstructive pulmonary disease)    • Coronary artery disease    • Emphysema of lung    • Emphysema/COPD    • Multiple allergies        Family History:  Family History   Problem Relation Age of Onset   • Lung disease Mother    • Lung cancer Father    • Diabetes Sister    • Cirrhosis Brother    • Liver disease Brother    • Heart disease Maternal Grandmother    • Heart disease Maternal Grandfather    • Heart disease Paternal Grandmother    • Heart disease Paternal Grandfather        Social History:   reports that she has been smoking Cigarettes.  She has been smoking about 1.00 pack per day. She has never used smokeless tobacco. She reports that she does not drink alcohol or use illicit drugs.    Medications:   Prior to Admission medications    Medication Sig Start Date End Date Taking? Authorizing Provider   dicyclomine (BENTYL) 10 MG capsule TAKE 1 CAPSULE BY MOUTH THREE TIMES DAILY 4/6/17  Yes Historical Provider, MD   esomeprazole (nexIUM) 40 MG capsule Take 40 mg by mouth Every Morning Before Breakfast.   Yes Historical Provider, MD   promethazine (PHENERGAN) 25 MG tablet TAKE 1/2 - 1 TABLET BY MOUTH TWICE DAILY AS NEEDED FOR NAUSEA 4/6/17  Yes Historical Provider, MD   Sofosbuvir-Velpatasvir 400-100 MG tablet Take 1 tablet by mouth Daily. 9/5/17  Yes Jorge Lugo MD   sucralfate (CARAFATE) 1 GM/10ML  "suspension Take 10 mL by mouth 4 (Four) Times a Day. 7/11/17  Yes Jorge Lugo MD   tiZANidine (ZANAFLEX) 4 MG tablet TAKE 1 TABLET BY MOUTH TWICE DAILY AS NEEDED 3/11/17  Yes Historical Provider, MD   promethazine (PHENERGAN) 25 MG tablet Take 1 tablet by mouth Every 6 (Six) Hours As Needed for Nausea or Vomiting. 12/7/17   Jorge Lugo MD       Allergies:  Sudafed [pseudoephedrine hcl]; Codeine; Iodine; and Keflex [cephalexin]    ROS:    Review of Systems   Constitutional: Negative for activity change, appetite change, chills, diaphoresis, fatigue, fever and unexpected weight change.   HENT: Negative for sore throat and trouble swallowing.    Respiratory: Negative for shortness of breath.    Gastrointestinal: Positive for abdominal pain and nausea. Negative for abdominal distention, anal bleeding, blood in stool, constipation, diarrhea, rectal pain and vomiting.   Musculoskeletal: Negative for arthralgias.   Skin: Negative for pallor.   Neurological: Negative for light-headedness.     Objective     Blood pressure 120/68, pulse 80, height 167.6 cm (66\"), weight 55.3 kg (122 lb), SpO2 98 %.    Physical Exam   Constitutional: She is oriented to person, place, and time. She appears well-developed and well-nourished. No distress.   HENT:   Head: Normocephalic and atraumatic.   Cardiovascular: Normal rate, regular rhythm, normal heart sounds and intact distal pulses.  Exam reveals no gallop and no friction rub.    No murmur heard.  Pulmonary/Chest: Breath sounds normal. No respiratory distress. She has no wheezes. She has no rales. She exhibits no tenderness.   Abdominal: Soft. Bowel sounds are normal. She exhibits no distension and no mass. There is tenderness. There is no rebound and no guarding. No hernia.   Musculoskeletal: Normal range of motion. She exhibits no edema.   Neurological: She is alert and oriented to person, place, and time.   Skin: Skin is warm and dry. No rash noted. She is not diaphoretic. No " erythema. No pallor.   Psychiatric: She has a normal mood and affect. Her behavior is normal. Judgment and thought content normal.        Assessment/Plan   Guillermina was seen today for abdominal pain.    Diagnoses and all orders for this visit:    Chronic hepatitis C without hepatic coma  -     AFP Tumor Marker  -     CBC & Differential  -     Comprehensive Metabolic Panel  -     Ethanol  -     HCV FibroSURE  -     HCV NS5A Drug Resistance Assay; Future  -     Hepatitis C Genotype; Future  -     Hepatitis C RNA, Quantitative, PCR (graph)  -     Urine Drug Screen - Urine, Clean Catch  -     Hepatitis B & C Profile  -     Hepatitis B DNA, Quantitative, PCR    Other orders  -     promethazine (PHENERGAN) 25 MG tablet; Take 1 tablet by mouth Every 6 (Six) Hours As Needed for Nausea or Vomiting.        * Surgery not found *     Diagnosis Plan   1. Chronic hepatitis C without hepatic coma  AFP Tumor Marker    CBC & Differential    Comprehensive Metabolic Panel    Ethanol    HCV FibroSURE    HCV NS5A Drug Resistance Assay    Hepatitis C Genotype    Hepatitis C RNA, Quantitative, PCR (graph)    Urine Drug Screen - Urine, Clean Catch    Hepatitis B & C Profile    Hepatitis B DNA, Quantitative, PCR       Anticipated Surgical Procedure:  Orders Placed This Encounter   Procedures   • AFP Tumor Marker   • Comprehensive Metabolic Panel   • Ethanol   • HCV FibroSURE   • HCV NS5A Drug Resistance Assay     Standing Status:   Future     Standing Expiration Date:   12/7/2018   • Hepatitis C Genotype     Standing Status:   Future     Standing Expiration Date:   12/7/2018   • Hepatitis C RNA, Quantitative, PCR (graph)   • Urine Drug Screen - Urine, Clean Catch   • Hepatitis B & C Profile   • Hepatitis B DNA, Quantitative, PCR   • CBC & Differential     Order Specific Question:   Manual Differential     Answer:   No       The risks, benefits, and alternatives of this procedure have been discussed with the patient or the responsible party- the  patient understands and agrees to proceed.

## 2017-12-08 LAB
AFP-TM SERPL-MCNC: 10.2 NG/ML (ref 0–8.3)
HBV CORE AB SER DONR QL IA: NEGATIVE
HBV CORE IGM SERPL QL IA: NEGATIVE
HBV DNA SERPL NAA+PROBE-ACNC: NORMAL IU/ML
HBV E AB SERPL QL IA: NEGATIVE
HBV E AG SERPL QL IA: NEGATIVE
HBV SURFACE AB SER QL: NON REACTIVE
HBV SURFACE AG SERPL QL IA: NEGATIVE
HCV AB S/CO SERPL IA: >11 S/CO RATIO (ref 0–0.9)
HCV RNA SERPL NAA+PROBE-ACNC: NORMAL IU/ML
LABORATORY COMMENT REPORT: NORMAL
LOG10 HBV IU/ML: NORMAL LOG10IU/ML
TEST INFORMATION: NORMAL
TEST INFORMATION: NORMAL

## 2017-12-10 LAB
A2 MACROGLOB SERPL-MCNC: 462 MG/DL (ref 110–276)
ALT SERPL W P-5'-P-CCNC: 18 IU/L (ref 0–40)
APO A-I SERPL-MCNC: 119 MG/DL (ref 116–209)
BILIRUB SERPL-MCNC: 0.3 MG/DL (ref 0–1.2)
FIBROSIS SCORING:: ABNORMAL
FIBROSIS STAGE SERPL QL: ABNORMAL
GGT SERPL-CCNC: 33 IU/L (ref 0–60)
HAPTOGLOB SERPL-MCNC: 76 MG/DL (ref 34–200)
HCV AB SER QL: ABNORMAL
HCV GENTYP SERPL NAA+PROBE: NORMAL
LABORATORY COMMENT REPORT: ABNORMAL
LIMITATIONS:: ABNORMAL
LIVER FIBR SCORE SERPL CALC.FIBROSURE: 0.65 (ref 0–0.21)
Lab: NORMAL
NECROINFLAMM ACTIVITY SCORING:: ABNORMAL
NECROINFLAMMATORY ACT GRADE SERPL QL: ABNORMAL
NECROINFLAMMATORY ACT SCORE SERPL: 0.11 (ref 0–0.17)

## 2017-12-11 ENCOUNTER — TELEPHONE (OUTPATIENT)
Dept: GASTROENTEROLOGY | Facility: CLINIC | Age: 57
End: 2017-12-11

## 2017-12-11 NOTE — TELEPHONE ENCOUNTER
----- Message from Eileen Bonilla sent at 12/8/2017  3:59 PM CST -----  Contact: [phone]  Patient is needing to do labs but has no orders and would like to have her labs done in [place]. Patient would like you to call her about this matter.

## 2017-12-12 ENCOUNTER — TELEPHONE (OUTPATIENT)
Dept: GASTROENTEROLOGY | Facility: CLINIC | Age: 57
End: 2017-12-12

## 2017-12-12 NOTE — TELEPHONE ENCOUNTER
Patient phone call is returned. Patient is wanting the results of her Hep C quant testing and her CT scan results. Patient is given her test results as they read. Patient is informed that if problems worsen or persist to seek emergency medical attention. Patient agrees.        ----- Message from Mel Pelletier sent at 12/12/2017  3:24 PM CST -----  Results of ct and labs please

## 2017-12-16 LAB
HCV NS5 MUT DET ISLT GENOTYP: NORMAL
REF LAB TEST METHOD: NORMAL

## 2018-01-01 ENCOUNTER — READMISSION MANAGEMENT (OUTPATIENT)
Dept: CALL CENTER | Facility: HOSPITAL | Age: 58
End: 2018-01-01

## 2018-01-01 ENCOUNTER — ANESTHESIA (OUTPATIENT)
Dept: GASTROENTEROLOGY | Facility: HOSPITAL | Age: 58
End: 2018-01-01

## 2018-01-01 ENCOUNTER — TELEPHONE (OUTPATIENT)
Dept: GASTROENTEROLOGY | Facility: CLINIC | Age: 58
End: 2018-01-01

## 2018-01-01 ENCOUNTER — APPOINTMENT (OUTPATIENT)
Dept: INTERVENTIONAL RADIOLOGY/VASCULAR | Facility: HOSPITAL | Age: 58
End: 2018-01-01

## 2018-01-01 ENCOUNTER — OFFICE VISIT (OUTPATIENT)
Dept: GASTROENTEROLOGY | Facility: CLINIC | Age: 58
End: 2018-01-01

## 2018-01-01 ENCOUNTER — ANESTHESIA EVENT (OUTPATIENT)
Dept: GASTROENTEROLOGY | Facility: HOSPITAL | Age: 58
End: 2018-01-01

## 2018-01-01 ENCOUNTER — APPOINTMENT (OUTPATIENT)
Dept: CT IMAGING | Facility: HOSPITAL | Age: 58
End: 2018-01-01

## 2018-01-01 ENCOUNTER — LAB (OUTPATIENT)
Dept: LAB | Facility: HOSPITAL | Age: 58
End: 2018-01-01
Attending: INTERNAL MEDICINE

## 2018-01-01 ENCOUNTER — APPOINTMENT (OUTPATIENT)
Dept: ULTRASOUND IMAGING | Facility: HOSPITAL | Age: 58
End: 2018-01-01

## 2018-01-01 ENCOUNTER — DOCUMENTATION (OUTPATIENT)
Dept: GASTROENTEROLOGY | Facility: CLINIC | Age: 58
End: 2018-01-01

## 2018-01-01 ENCOUNTER — DOCUMENTATION (OUTPATIENT)
Dept: ONCOLOGY | Facility: CLINIC | Age: 58
End: 2018-01-01

## 2018-01-01 ENCOUNTER — APPOINTMENT (OUTPATIENT)
Dept: MRI IMAGING | Facility: HOSPITAL | Age: 58
End: 2018-01-01

## 2018-01-01 ENCOUNTER — APPOINTMENT (OUTPATIENT)
Dept: ONCOLOGY | Facility: CLINIC | Age: 58
End: 2018-01-01

## 2018-01-01 ENCOUNTER — HOSPITAL ENCOUNTER (INPATIENT)
Facility: HOSPITAL | Age: 58
LOS: 7 days | Discharge: HOME | End: 2018-11-29
Attending: FAMILY MEDICINE | Admitting: HOSPITALIST

## 2018-01-01 ENCOUNTER — HOSPITAL ENCOUNTER (OUTPATIENT)
Facility: HOSPITAL | Age: 58
Setting detail: HOSPITAL OUTPATIENT SURGERY
Discharge: HOME | End: 2018-11-06
Attending: INTERNAL MEDICINE | Admitting: INTERNAL MEDICINE

## 2018-01-01 VITALS
WEIGHT: 128.4 LBS | DIASTOLIC BLOOD PRESSURE: 74 MMHG | SYSTOLIC BLOOD PRESSURE: 104 MMHG | OXYGEN SATURATION: 93 % | HEIGHT: 66 IN | BODY MASS INDEX: 20.63 KG/M2 | HEART RATE: 102 BPM

## 2018-01-01 VITALS
HEART RATE: 88 BPM | BODY MASS INDEX: 20.99 KG/M2 | OXYGEN SATURATION: 96 % | DIASTOLIC BLOOD PRESSURE: 60 MMHG | WEIGHT: 130.6 LBS | SYSTOLIC BLOOD PRESSURE: 100 MMHG | HEIGHT: 66 IN

## 2018-01-01 VITALS
WEIGHT: 92.81 LBS | TEMPERATURE: 97.8 F | HEIGHT: 66 IN | HEART RATE: 92 BPM | RESPIRATION RATE: 17 BRPM | BODY MASS INDEX: 14.92 KG/M2 | SYSTOLIC BLOOD PRESSURE: 104 MMHG | DIASTOLIC BLOOD PRESSURE: 69 MMHG | OXYGEN SATURATION: 94 %

## 2018-01-01 VITALS
HEIGHT: 66 IN | OXYGEN SATURATION: 99 % | BODY MASS INDEX: 15.01 KG/M2 | DIASTOLIC BLOOD PRESSURE: 72 MMHG | SYSTOLIC BLOOD PRESSURE: 104 MMHG | WEIGHT: 93.4 LBS | HEART RATE: 116 BPM

## 2018-01-01 VITALS
OXYGEN SATURATION: 99 % | HEART RATE: 115 BPM | RESPIRATION RATE: 18 BRPM | TEMPERATURE: 97.6 F | DIASTOLIC BLOOD PRESSURE: 74 MMHG | SYSTOLIC BLOOD PRESSURE: 113 MMHG | HEIGHT: 66 IN | WEIGHT: 113.8 LBS | BODY MASS INDEX: 18.29 KG/M2

## 2018-01-01 VITALS
DIASTOLIC BLOOD PRESSURE: 84 MMHG | SYSTOLIC BLOOD PRESSURE: 100 MMHG | HEART RATE: 118 BPM | BODY MASS INDEX: 20.99 KG/M2 | HEIGHT: 66 IN | OXYGEN SATURATION: 94 % | WEIGHT: 130.6 LBS

## 2018-01-01 VITALS
OXYGEN SATURATION: 95 % | HEART RATE: 94 BPM | WEIGHT: 106.2 LBS | DIASTOLIC BLOOD PRESSURE: 60 MMHG | SYSTOLIC BLOOD PRESSURE: 100 MMHG | BODY MASS INDEX: 17.07 KG/M2 | HEIGHT: 66 IN

## 2018-01-01 DIAGNOSIS — Z74.09 IMPAIRED MOBILITY AND ADLS: ICD-10-CM

## 2018-01-01 DIAGNOSIS — R10.9 ABDOMINAL PAIN, UNSPECIFIED ABDOMINAL LOCATION: ICD-10-CM

## 2018-01-01 DIAGNOSIS — R10.11 RUQ ABDOMINAL PAIN: Primary | ICD-10-CM

## 2018-01-01 DIAGNOSIS — B18.2 CHRONIC HEPATITIS C WITHOUT HEPATIC COMA (HCC): ICD-10-CM

## 2018-01-01 DIAGNOSIS — Z74.09 IMPAIRED FUNCTIONAL MOBILITY, BALANCE, GAIT, AND ENDURANCE: ICD-10-CM

## 2018-01-01 DIAGNOSIS — Z78.9 IMPAIRED MOBILITY AND ADLS: ICD-10-CM

## 2018-01-01 DIAGNOSIS — R10.13 EPIGASTRIC PAIN: ICD-10-CM

## 2018-01-01 DIAGNOSIS — K74.60 CIRRHOSIS OF LIVER WITHOUT ASCITES, UNSPECIFIED HEPATIC CIRRHOSIS TYPE (HCC): Primary | ICD-10-CM

## 2018-01-01 DIAGNOSIS — R93.2 ABNORMAL FINDINGS ON DIAGNOSTIC IMAGING OF LIVER AND BILIARY TRACT: ICD-10-CM

## 2018-01-01 DIAGNOSIS — R11.0 NAUSEA: Primary | ICD-10-CM

## 2018-01-01 DIAGNOSIS — R10.9 ABDOMINAL PAIN, UNSPECIFIED ABDOMINAL LOCATION: Primary | ICD-10-CM

## 2018-01-01 DIAGNOSIS — R11.2 NAUSEA AND VOMITING IN ADULT: Primary | ICD-10-CM

## 2018-01-01 DIAGNOSIS — F33.9 EPISODE OF RECURRENT MAJOR DEPRESSIVE DISORDER, UNSPECIFIED DEPRESSION EPISODE SEVERITY (HCC): Primary | ICD-10-CM

## 2018-01-01 DIAGNOSIS — R10.11 RIGHT UPPER QUADRANT ABDOMINAL PAIN: ICD-10-CM

## 2018-01-01 DIAGNOSIS — B18.2 CHRONIC HEPATITIS C WITHOUT HEPATIC COMA (HCC): Primary | ICD-10-CM
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
## 2018-01-01 DIAGNOSIS — R11.0 NAUSEA: ICD-10-CM

## 2018-01-01 DIAGNOSIS — Z51.5 HOSPICE CARE: Primary | ICD-10-CM

## 2018-01-01 DIAGNOSIS — R10.13 EPIGASTRIC PAIN: Primary | ICD-10-CM

## 2018-01-01 DIAGNOSIS — K76.9 LIVER DISEASE: ICD-10-CM

## 2018-01-01 DIAGNOSIS — Z51.5 HOSPICE CARE: ICD-10-CM

## 2018-01-01 DIAGNOSIS — Z74.09 IMPAIRED PHYSICAL MOBILITY: ICD-10-CM

## 2018-01-01 DIAGNOSIS — R13.10 DYSPHAGIA, UNSPECIFIED TYPE: ICD-10-CM

## 2018-01-01 LAB
AFP-TM SERPL-MCNC: 12.8 NG/ML (ref 0–8.3)
AFP-TM SERPL-MCNC: ABNORMAL NG/ML (ref 0–8.3)
ALBUMIN FLD-MCNC: <1 G/DL
ALBUMIN SERPL-MCNC: 2.3 G/DL (ref 3.4–4.8)
ALBUMIN SERPL-MCNC: 2.4 G/DL (ref 3.4–4.8)
ALBUMIN SERPL-MCNC: 2.4 G/DL (ref 3.4–4.8)
ALBUMIN SERPL-MCNC: 2.6 G/DL (ref 3.4–4.8)
ALBUMIN SERPL-MCNC: 2.7 G/DL (ref 3.4–4.8)
ALBUMIN SERPL-MCNC: 2.8 G/DL (ref 3.4–4.8)
ALBUMIN SERPL-MCNC: 2.8 G/DL (ref 3.4–4.8)
ALBUMIN SERPL-MCNC: 4.1 G/DL (ref 3.4–4.8)
ALBUMIN/GLOB SERPL: 0.6 G/DL (ref 1.1–1.8)
ALBUMIN/GLOB SERPL: 0.7 G/DL (ref 1.1–1.8)
ALBUMIN/GLOB SERPL: 0.9 G/DL (ref 1.1–1.8)
ALP SERPL-CCNC: 136 U/L (ref 38–126)
ALP SERPL-CCNC: 150 U/L (ref 38–126)
ALP SERPL-CCNC: 150 U/L (ref 38–126)
ALP SERPL-CCNC: 172 U/L (ref 38–126)
ALP SERPL-CCNC: 175 U/L (ref 38–126)
ALP SERPL-CCNC: 185 U/L (ref 38–126)
ALP SERPL-CCNC: 186 U/L (ref 38–126)
ALP SERPL-CCNC: 190 U/L (ref 38–126)
ALP SERPL-CCNC: 191 U/L (ref 38–126)
ALP SERPL-CCNC: 193 U/L (ref 38–126)
ALP SERPL-CCNC: 211 U/L (ref 38–126)
ALP SERPL-CCNC: 234 U/L (ref 38–126)
ALT SERPL W P-5'-P-CCNC: 103 U/L (ref 9–52)
ALT SERPL W P-5'-P-CCNC: 109 U/L (ref 9–52)
ALT SERPL W P-5'-P-CCNC: 123 U/L (ref 9–52)
ALT SERPL W P-5'-P-CCNC: 130 U/L (ref 9–52)
ALT SERPL W P-5'-P-CCNC: 154 U/L (ref 9–52)
ALT SERPL W P-5'-P-CCNC: 21 U/L (ref 9–52)
ALT SERPL W P-5'-P-CCNC: 70 U/L (ref 9–52)
ALT SERPL W P-5'-P-CCNC: 70 U/L (ref 9–52)
ALT SERPL W P-5'-P-CCNC: 71 U/L (ref 9–52)
ALT SERPL W P-5'-P-CCNC: 76 U/L (ref 9–52)
ALT SERPL W P-5'-P-CCNC: 79 U/L (ref 9–52)
ALT SERPL W P-5'-P-CCNC: 80 U/L (ref 9–52)
AMMONIA BLD-SCNC: 59 UMOL/L (ref 9–30)
AMYLASE FLD-CCNC: <30 U/L
ANION GAP SERPL CALCULATED.3IONS-SCNC: 10 MMOL/L (ref 5–15)
ANION GAP SERPL CALCULATED.3IONS-SCNC: 14 MMOL/L (ref 5–15)
ANION GAP SERPL CALCULATED.3IONS-SCNC: 3 MMOL/L (ref 5–15)
ANION GAP SERPL CALCULATED.3IONS-SCNC: 4 MMOL/L (ref 5–15)
ANION GAP SERPL CALCULATED.3IONS-SCNC: 5 MMOL/L (ref 5–15)
ANION GAP SERPL CALCULATED.3IONS-SCNC: 6 MMOL/L (ref 5–15)
ANION GAP SERPL CALCULATED.3IONS-SCNC: 6 MMOL/L (ref 5–15)
ANION GAP SERPL CALCULATED.3IONS-SCNC: 7 MMOL/L (ref 5–15)
ANION GAP SERPL CALCULATED.3IONS-SCNC: 8 MMOL/L (ref 5–15)
ANION GAP SERPL CALCULATED.3IONS-SCNC: 9 MMOL/L (ref 5–15)
APPEARANCE FLD: ABNORMAL
APTT PPP: 30.9 SECONDS (ref 20–40.3)
APTT PPP: 31.8 SECONDS (ref 20–40.3)
ARTERIAL PATENCY WRIST A: ABNORMAL
ARTERIAL PATENCY WRIST A: ABNORMAL
AST SERPL-CCNC: 191 U/L (ref 14–36)
AST SERPL-CCNC: 198 U/L (ref 14–36)
AST SERPL-CCNC: 204 U/L (ref 14–36)
AST SERPL-CCNC: 218 U/L (ref 14–36)
AST SERPL-CCNC: 221 U/L (ref 14–36)
AST SERPL-CCNC: 236 U/L (ref 14–36)
AST SERPL-CCNC: 279 U/L (ref 14–36)
AST SERPL-CCNC: 334 U/L (ref 14–36)
AST SERPL-CCNC: 34 U/L (ref 14–36)
AST SERPL-CCNC: 407 U/L (ref 14–36)
AST SERPL-CCNC: 568 U/L (ref 14–36)
AST SERPL-CCNC: 634 U/L (ref 14–36)
ATMOSPHERIC PRESS: 756 MMHG
ATMOSPHERIC PRESS: 756 MMHG
BACTERIA FLD CULT: NORMAL
BACTERIA SPEC ANAEROBE CULT: NORMAL
BASE EXCESS BLDA CALC-SCNC: 5.1 MMOL/L (ref 0–2)
BASE EXCESS BLDA CALC-SCNC: 9.3 MMOL/L (ref 0–2)
BASOPHILS # BLD AUTO: 0 10*3/MM3 (ref 0–0.2)
BASOPHILS # BLD AUTO: 0 10*3/MM3 (ref 0–0.2)
BASOPHILS # BLD AUTO: 0.01 10*3/MM3 (ref 0–0.2)
BASOPHILS # BLD AUTO: 0.02 10*3/MM3 (ref 0–0.2)
BASOPHILS NFR BLD AUTO: 0 % (ref 0–2)
BASOPHILS NFR BLD AUTO: 0 % (ref 0–2)
BASOPHILS NFR BLD AUTO: 0.1 % (ref 0–2)
BASOPHILS NFR BLD AUTO: 0.2 % (ref 0–2)
BASOPHILS NFR BLD AUTO: 0.3 % (ref 0–2)
BDY SITE: ABNORMAL
BDY SITE: ABNORMAL
BILIRUB SERPL-MCNC: 0.4 MG/DL (ref 0.2–1.3)
BILIRUB SERPL-MCNC: 0.5 MG/DL
BILIRUB SERPL-MCNC: 0.6 MG/DL (ref 0.2–1.3)
BILIRUB SERPL-MCNC: 0.6 MG/DL (ref 0.2–1.3)
BILIRUB SERPL-MCNC: 0.7 MG/DL (ref 0.2–1.3)
BILIRUB SERPL-MCNC: 0.8 MG/DL (ref 0.2–1.3)
BILIRUB SERPL-MCNC: 0.8 MG/DL (ref 0.2–1.3)
BILIRUB SERPL-MCNC: 0.9 MG/DL (ref 0.2–1.3)
BILIRUB SERPL-MCNC: 0.9 MG/DL (ref 0.2–1.3)
BILIRUB SERPL-MCNC: 1 MG/DL (ref 0.2–1.3)
BILIRUB SERPL-MCNC: 1.1 MG/DL (ref 0.2–1.3)
BILIRUB SERPL-MCNC: 1.1 MG/DL (ref 0.2–1.3)
BILIRUB SERPL-MCNC: 1.3 MG/DL (ref 0.2–1.3)
BUN BLD-MCNC: 10 MG/DL (ref 7–21)
BUN BLD-MCNC: 11 MG/DL (ref 7–21)
BUN BLD-MCNC: 12 MG/DL (ref 7–21)
BUN BLD-MCNC: 7 MG/DL (ref 7–21)
BUN BLD-MCNC: 7 MG/DL (ref 7–21)
BUN BLD-MCNC: 8 MG/DL (ref 7–21)
BUN BLD-MCNC: 9 MG/DL (ref 7–21)
BUN BLD-MCNC: 9 MG/DL (ref 7–21)
BUN/CREAT SERPL: 15.5 (ref 7–25)
BUN/CREAT SERPL: 22.9 (ref 7–25)
BUN/CREAT SERPL: 23.3 (ref 7–25)
BUN/CREAT SERPL: 25.9 (ref 7–25)
BUN/CREAT SERPL: 27.3 (ref 7–25)
BUN/CREAT SERPL: 27.6 (ref 7–25)
BUN/CREAT SERPL: 27.6 (ref 7–25)
BUN/CREAT SERPL: 28.6 (ref 7–25)
BUN/CREAT SERPL: 30.6 (ref 7–25)
BUN/CREAT SERPL: 32.4 (ref 7–25)
BUN/CREAT SERPL: 35.5 (ref 7–25)
BUN/CREAT SERPL: 38.7 (ref 7–25)
CALCIUM SPEC-SCNC: 7.5 MG/DL (ref 8.4–10.2)
CALCIUM SPEC-SCNC: 7.7 MG/DL (ref 8.4–10.2)
CALCIUM SPEC-SCNC: 7.8 MG/DL (ref 8.4–10.2)
CALCIUM SPEC-SCNC: 8 MG/DL (ref 8.4–10.2)
CALCIUM SPEC-SCNC: 8.1 MG/DL (ref 8.4–10.2)
CALCIUM SPEC-SCNC: 8.1 MG/DL (ref 8.4–10.2)
CALCIUM SPEC-SCNC: 8.2 MG/DL (ref 8.4–10.2)
CALCIUM SPEC-SCNC: 9.3 MG/DL (ref 8.4–10.2)
CANCER AG125 SERPL-ACNC: 654.7 U/ML (ref 0–38.1)
CANCER AG19-9 SERPL-ACNC: 30 U/ML (ref 0–35)
CANCER AG19-9 SERPL-ACNC: 34 U/ML (ref 0–35)
CEA SERPL-MCNC: 5.2 NG/ML (ref 0–5)
CHLORIDE SERPL-SCNC: 104 MMOL/L (ref 95–110)
CHLORIDE SERPL-SCNC: 105 MMOL/L (ref 95–110)
CHLORIDE SERPL-SCNC: 106 MMOL/L (ref 95–110)
CHLORIDE SERPL-SCNC: 107 MMOL/L (ref 95–110)
CHLORIDE SERPL-SCNC: 109 MMOL/L (ref 95–110)
CHLORIDE SERPL-SCNC: 95 MMOL/L (ref 95–110)
CHLORIDE SERPL-SCNC: 95 MMOL/L (ref 95–110)
CHLORIDE SERPL-SCNC: 96 MMOL/L (ref 95–110)
CHLORIDE SERPL-SCNC: 98 MMOL/L (ref 95–110)
CHLORIDE SERPL-SCNC: 99 MMOL/L (ref 95–110)
CO2 SERPL-SCNC: 20 MMOL/L (ref 22–31)
CO2 SERPL-SCNC: 21 MMOL/L (ref 22–31)
CO2 SERPL-SCNC: 22 MMOL/L (ref 22–31)
CO2 SERPL-SCNC: 23 MMOL/L (ref 22–31)
CO2 SERPL-SCNC: 24 MMOL/L (ref 22–31)
CO2 SERPL-SCNC: 24 MMOL/L (ref 22–31)
CO2 SERPL-SCNC: 25 MMOL/L (ref 22–31)
CO2 SERPL-SCNC: 25 MMOL/L (ref 22–31)
CO2 SERPL-SCNC: 26 MMOL/L (ref 22–31)
CO2 SERPL-SCNC: 28 MMOL/L (ref 22–31)
CO2 SERPL-SCNC: 29 MMOL/L (ref 22–31)
CO2 SERPL-SCNC: 30 MMOL/L (ref 22–31)
COLOR FLD: YELLOW
CREAT BLD-MCNC: 0.27 MG/DL (ref 0.5–1)
CREAT BLD-MCNC: 0.29 MG/DL (ref 0.5–1)
CREAT BLD-MCNC: 0.29 MG/DL (ref 0.5–1)
CREAT BLD-MCNC: 0.3 MG/DL (ref 0.5–1)
CREAT BLD-MCNC: 0.31 MG/DL (ref 0.5–1)
CREAT BLD-MCNC: 0.31 MG/DL (ref 0.5–1)
CREAT BLD-MCNC: 0.33 MG/DL (ref 0.5–1)
CREAT BLD-MCNC: 0.34 MG/DL (ref 0.5–1)
CREAT BLD-MCNC: 0.35 MG/DL (ref 0.5–1)
CREAT BLD-MCNC: 0.35 MG/DL (ref 0.5–1)
CREAT BLD-MCNC: 0.36 MG/DL (ref 0.5–1)
CREAT BLD-MCNC: 0.58 MG/DL (ref 0.5–1)
CYTO UR: NORMAL
CYTO UR: NORMAL
DEPRECATED RDW RBC AUTO: 47 FL (ref 36.4–46.3)
DEPRECATED RDW RBC AUTO: 52.5 FL (ref 36.4–46.3)
DEPRECATED RDW RBC AUTO: 53.5 FL (ref 36.4–46.3)
DEPRECATED RDW RBC AUTO: 55 FL (ref 36.4–46.3)
DEPRECATED RDW RBC AUTO: 55.1 FL (ref 36.4–46.3)
DEPRECATED RDW RBC AUTO: 55.3 FL (ref 36.4–46.3)
DEPRECATED RDW RBC AUTO: 55.4 FL (ref 36.4–46.3)
DEPRECATED RDW RBC AUTO: 56.7 FL (ref 36.4–46.3)
DEPRECATED RDW RBC AUTO: 56.8 FL (ref 36.4–46.3)
DEPRECATED RDW RBC AUTO: 57.2 FL (ref 36.4–46.3)
DEPRECATED RDW RBC AUTO: 57.9 FL (ref 36.4–46.3)
DEPRECATED RDW RBC AUTO: 57.9 FL (ref 36.4–46.3)
EOSINOPHIL # BLD AUTO: 0.01 10*3/MM3 (ref 0–0.7)
EOSINOPHIL # BLD AUTO: 0.02 10*3/MM3 (ref 0–0.7)
EOSINOPHIL # BLD AUTO: 0.03 10*3/MM3 (ref 0–0.7)
EOSINOPHIL # BLD AUTO: 0.04 10*3/MM3 (ref 0–0.7)
EOSINOPHIL # BLD AUTO: 0.04 10*3/MM3 (ref 0–0.7)
EOSINOPHIL # BLD AUTO: 0.05 10*3/MM3 (ref 0–0.7)
EOSINOPHIL # BLD AUTO: 0.05 10*3/MM3 (ref 0–0.7)
EOSINOPHIL NFR BLD AUTO: 0.1 % (ref 0–7)
EOSINOPHIL NFR BLD AUTO: 0.2 % (ref 0–7)
EOSINOPHIL NFR BLD AUTO: 0.2 % (ref 0–7)
EOSINOPHIL NFR BLD AUTO: 0.3 % (ref 0–7)
EOSINOPHIL NFR BLD AUTO: 0.4 % (ref 0–7)
EOSINOPHIL NFR BLD AUTO: 0.5 % (ref 0–7)
EOSINOPHIL NFR BLD AUTO: 0.5 % (ref 0–7)
EOSINOPHIL NFR BLD AUTO: 0.6 % (ref 0–7)
EOSINOPHIL NFR BLD AUTO: 0.7 % (ref 0–7)
EOSINOPHIL NFR BLD AUTO: 0.8 % (ref 0–7)
ERYTHROCYTE [DISTWIDTH] IN BLOOD BY AUTOMATED COUNT: 14.7 % (ref 11.5–14.5)
ERYTHROCYTE [DISTWIDTH] IN BLOOD BY AUTOMATED COUNT: 17.2 % (ref 11.5–14.5)
ERYTHROCYTE [DISTWIDTH] IN BLOOD BY AUTOMATED COUNT: 17.4 % (ref 11.5–14.5)
ERYTHROCYTE [DISTWIDTH] IN BLOOD BY AUTOMATED COUNT: 17.8 % (ref 11.5–14.5)
ERYTHROCYTE [DISTWIDTH] IN BLOOD BY AUTOMATED COUNT: 17.8 % (ref 11.5–14.5)
ERYTHROCYTE [DISTWIDTH] IN BLOOD BY AUTOMATED COUNT: 18 % (ref 11.5–14.5)
ERYTHROCYTE [DISTWIDTH] IN BLOOD BY AUTOMATED COUNT: 18.2 % (ref 11.5–14.5)
ERYTHROCYTE [DISTWIDTH] IN BLOOD BY AUTOMATED COUNT: 18.3 % (ref 11.5–14.5)
ERYTHROCYTE [DISTWIDTH] IN BLOOD BY AUTOMATED COUNT: 18.4 % (ref 11.5–14.5)
ERYTHROCYTE [DISTWIDTH] IN BLOOD BY AUTOMATED COUNT: 18.5 % (ref 11.5–14.5)
ERYTHROCYTE [DISTWIDTH] IN BLOOD BY AUTOMATED COUNT: 18.6 % (ref 11.5–14.5)
ERYTHROCYTE [DISTWIDTH] IN BLOOD BY AUTOMATED COUNT: 18.8 % (ref 11.5–14.5)
FOLATE SERPL-MCNC: 3.92 NG/ML (ref 2.76–21)
GFR SERPL CREATININE-BSD FRML MDRD: 106 ML/MIN/1.73 (ref 60–120)
GFR SERPL CREATININE-BSD FRML MDRD: 184 ML/MIN/1.73 (ref 45–104)
GFR SERPL CREATININE-BSD FRML MDRD: 190 ML/MIN/1.73 (ref 45–104)
GFR SERPL CREATININE-BSD FRML MDRD: 190 ML/MIN/1.73 (ref 45–104)
GFR SERPL CREATININE-BSD FRML MDRD: 196 ML/MIN/1.73 (ref 45–104)
GFR SERPL CREATININE-BSD FRML MDRD: 203 ML/MIN/1.73 (ref 45–104)
GFR SERPL CREATININE-BSD FRML MDRD: 218 ML/MIN/1.73 (ref 45–104)
GFR SERPL CREATININE-BSD FRML MDRD: 218 ML/MIN/1.73 (ref 45–104)
GFR SERPL CREATININE-BSD FRML MDRD: 227 ML/MIN/1.73 (ref 45–104)
GFR SERPL CREATININE-BSD FRML MDRD: 236 ML/MIN/1.73 (ref 45–104)
GFR SERPL CREATININE-BSD FRML MDRD: 236 ML/MIN/1.73 (ref 45–104)
GFR SERPL CREATININE-BSD FRML MDRD: 256 ML/MIN/1.73 (ref 45–104)
GLOBULIN UR ELPH-MCNC: 3.5 GM/DL (ref 2.3–3.5)
GLOBULIN UR ELPH-MCNC: 3.6 GM/DL (ref 2.3–3.5)
GLOBULIN UR ELPH-MCNC: 3.8 GM/DL (ref 2.3–3.5)
GLOBULIN UR ELPH-MCNC: 3.9 GM/DL (ref 2.3–3.5)
GLOBULIN UR ELPH-MCNC: 4 GM/DL (ref 2.3–3.5)
GLOBULIN UR ELPH-MCNC: 4.4 GM/DL (ref 2.3–3.5)
GLUCOSE BLD-MCNC: 101 MG/DL (ref 60–100)
GLUCOSE BLD-MCNC: 104 MG/DL (ref 60–100)
GLUCOSE BLD-MCNC: 109 MG/DL (ref 60–100)
GLUCOSE BLD-MCNC: 112 MG/DL (ref 60–100)
GLUCOSE BLD-MCNC: 119 MG/DL (ref 60–100)
GLUCOSE BLD-MCNC: 129 MG/DL (ref 60–100)
GLUCOSE BLD-MCNC: 130 MG/DL (ref 60–100)
GLUCOSE BLD-MCNC: 85 MG/DL (ref 60–100)
GLUCOSE BLD-MCNC: 89 MG/DL (ref 60–100)
GLUCOSE BLD-MCNC: 89 MG/DL (ref 60–100)
GLUCOSE BLD-MCNC: 95 MG/DL (ref 60–100)
GLUCOSE BLD-MCNC: 99 MG/DL (ref 60–100)
GLUCOSE FLD-MCNC: 109 MG/DL
GRAM STN SPEC: NORMAL
HCO3 BLDA-SCNC: 27.8 MMOL/L (ref 20–26)
HCO3 BLDA-SCNC: 28.7 MMOL/L (ref 20–26)
HCT VFR BLD AUTO: 31 % (ref 35–45)
HCT VFR BLD AUTO: 31.7 % (ref 35–45)
HCT VFR BLD AUTO: 33 % (ref 35–45)
HCT VFR BLD AUTO: 33.1 % (ref 35–45)
HCT VFR BLD AUTO: 33.1 % (ref 35–45)
HCT VFR BLD AUTO: 33.5 % (ref 35–45)
HCT VFR BLD AUTO: 34.3 % (ref 35–45)
HCT VFR BLD AUTO: 34.9 % (ref 35–45)
HCT VFR BLD AUTO: 34.9 % (ref 35–45)
HCT VFR BLD AUTO: 35.1 % (ref 35–45)
HCT VFR BLD AUTO: 36 % (ref 35–45)
HCT VFR BLD AUTO: 43.7 % (ref 35–45)
HCV RNA SERPL NAA+PROBE-ACNC: NORMAL IU/ML
HGB BLD-MCNC: 10.7 G/DL (ref 12–15.5)
HGB BLD-MCNC: 11 G/DL (ref 12–15.5)
HGB BLD-MCNC: 11.2 G/DL (ref 12–15.5)
HGB BLD-MCNC: 11.4 G/DL (ref 12–15.5)
HGB BLD-MCNC: 11.5 G/DL (ref 12–15.5)
HGB BLD-MCNC: 11.6 G/DL (ref 12–15.5)
HGB BLD-MCNC: 11.9 G/DL (ref 12–15.5)
HGB BLD-MCNC: 12.1 G/DL (ref 12–15.5)
HGB BLD-MCNC: 12.2 G/DL (ref 12–15.5)
HGB BLD-MCNC: 12.3 G/DL (ref 12–15.5)
HGB BLD-MCNC: 12.4 G/DL (ref 12–15.5)
HGB BLD-MCNC: 15.1 G/DL (ref 12–15.5)
HOLD SPECIMEN: NORMAL
HOLD SPECIMEN: NORMAL
IMM GRANULOCYTES # BLD: 0.01 10*3/MM3 (ref 0–0.02)
IMM GRANULOCYTES # BLD: 0.01 10*3/MM3 (ref 0–0.02)
IMM GRANULOCYTES # BLD: 0.02 10*3/MM3 (ref 0–0.02)
IMM GRANULOCYTES # BLD: 0.02 10*3/MM3 (ref 0–0.02)
IMM GRANULOCYTES # BLD: 0.03 10*3/MM3 (ref 0–0.02)
IMM GRANULOCYTES # BLD: 0.04 10*3/MM3 (ref 0–0.02)
IMM GRANULOCYTES # BLD: 0.05 10*3/MM3 (ref 0–0.02)
IMM GRANULOCYTES # BLD: 0.06 10*3/MM3 (ref 0–0.02)
IMM GRANULOCYTES NFR BLD: 0.1 % (ref 0–0.5)
IMM GRANULOCYTES NFR BLD: 0.2 % (ref 0–0.5)
IMM GRANULOCYTES NFR BLD: 0.3 % (ref 0–0.5)
IMM GRANULOCYTES NFR BLD: 0.4 % (ref 0–0.5)
IMM GRANULOCYTES NFR BLD: 0.5 % (ref 0–0.5)
IMM GRANULOCYTES NFR BLD: 0.5 % (ref 0–0.5)
IMM GRANULOCYTES NFR BLD: 0.7 % (ref 0–0.5)
IMM GRANULOCYTES NFR BLD: 0.8 % (ref 0–0.5)
INR PPP: 1.37 (ref 0.8–1.2)
INR PPP: 1.6 (ref 0.8–1.2)
KOH PREP NAIL: ABNORMAL
LAB AP CASE REPORT: NORMAL
LAB AP DIAGNOSIS COMMENT: NORMAL
LAB AP INTRADEPARTMENTAL CONSULT: NORMAL
LAB AP NON-GYN SPECIMEN ADEQUACY: NORMAL
LAB AP SPECIAL STAINS: NORMAL
LDH FLD-CCNC: 573 U/L
LDH SERPL-CCNC: 655 U/L (ref 313–618)
LYMPHOCYTES # BLD AUTO: 1.01 10*3/MM3 (ref 0.6–4.2)
LYMPHOCYTES # BLD AUTO: 1.11 10*3/MM3 (ref 0.6–4.2)
LYMPHOCYTES # BLD AUTO: 1.11 10*3/MM3 (ref 0.6–4.2)
LYMPHOCYTES # BLD AUTO: 1.14 10*3/MM3 (ref 0.6–4.2)
LYMPHOCYTES # BLD AUTO: 1.17 10*3/MM3 (ref 0.6–4.2)
LYMPHOCYTES # BLD AUTO: 1.18 10*3/MM3 (ref 0.6–4.2)
LYMPHOCYTES # BLD AUTO: 1.33 10*3/MM3 (ref 0.6–4.2)
LYMPHOCYTES # BLD AUTO: 1.41 10*3/MM3 (ref 0.6–4.2)
LYMPHOCYTES # BLD AUTO: 1.44 10*3/MM3 (ref 0.6–4.2)
LYMPHOCYTES # BLD AUTO: 1.51 10*3/MM3 (ref 0.6–4.2)
LYMPHOCYTES # BLD AUTO: 1.52 10*3/MM3 (ref 0.6–4.2)
LYMPHOCYTES # BLD AUTO: 1.83 10*3/MM3 (ref 0.6–4.2)
LYMPHOCYTES NFR BLD AUTO: 10.6 % (ref 10–50)
LYMPHOCYTES NFR BLD AUTO: 10.8 % (ref 10–50)
LYMPHOCYTES NFR BLD AUTO: 12.3 % (ref 10–50)
LYMPHOCYTES NFR BLD AUTO: 13.3 % (ref 10–50)
LYMPHOCYTES NFR BLD AUTO: 17.3 % (ref 10–50)
LYMPHOCYTES NFR BLD AUTO: 19.3 % (ref 10–50)
LYMPHOCYTES NFR BLD AUTO: 19.8 % (ref 10–50)
LYMPHOCYTES NFR BLD AUTO: 20 % (ref 10–50)
LYMPHOCYTES NFR BLD AUTO: 20.3 % (ref 10–50)
LYMPHOCYTES NFR BLD AUTO: 22 % (ref 10–50)
LYMPHOCYTES NFR BLD AUTO: 24.6 % (ref 10–50)
LYMPHOCYTES NFR BLD AUTO: 25.2 % (ref 10–50)
Lab: ABNORMAL
Lab: ABNORMAL
Lab: NORMAL
MCH RBC QN AUTO: 28.6 PG (ref 26.5–34)
MCH RBC QN AUTO: 28.9 PG (ref 26.5–34)
MCH RBC QN AUTO: 29.2 PG (ref 26.5–34)
MCH RBC QN AUTO: 29.3 PG (ref 26.5–34)
MCH RBC QN AUTO: 29.3 PG (ref 26.5–34)
MCH RBC QN AUTO: 29.4 PG (ref 26.5–34)
MCH RBC QN AUTO: 29.7 PG (ref 26.5–34)
MCH RBC QN AUTO: 30.1 PG (ref 26.5–34)
MCH RBC QN AUTO: 30.2 PG (ref 26.5–34)
MCHC RBC AUTO-ENTMCNC: 33.2 G/DL (ref 31.4–36)
MCHC RBC AUTO-ENTMCNC: 33.9 G/DL (ref 31.4–36)
MCHC RBC AUTO-ENTMCNC: 33.9 G/DL (ref 31.4–36)
MCHC RBC AUTO-ENTMCNC: 34.4 G/DL (ref 31.4–36)
MCHC RBC AUTO-ENTMCNC: 34.5 G/DL (ref 31.4–36)
MCHC RBC AUTO-ENTMCNC: 34.6 G/DL (ref 31.4–36)
MCHC RBC AUTO-ENTMCNC: 34.6 G/DL (ref 31.4–36)
MCHC RBC AUTO-ENTMCNC: 34.7 G/DL (ref 31.4–36)
MCHC RBC AUTO-ENTMCNC: 34.7 G/DL (ref 31.4–36)
MCHC RBC AUTO-ENTMCNC: 35 G/DL (ref 31.4–36)
MCHC RBC AUTO-ENTMCNC: 35.5 G/DL (ref 31.4–36)
MCHC RBC AUTO-ENTMCNC: 36.3 G/DL (ref 31.4–36)
MCV RBC AUTO: 83 FL (ref 80–98)
MCV RBC AUTO: 83.5 FL (ref 80–98)
MCV RBC AUTO: 83.5 FL (ref 80–98)
MCV RBC AUTO: 83.8 FL (ref 80–98)
MCV RBC AUTO: 84.3 FL (ref 80–98)
MCV RBC AUTO: 84.5 FL (ref 80–98)
MCV RBC AUTO: 84.6 FL (ref 80–98)
MCV RBC AUTO: 84.9 FL (ref 80–98)
MCV RBC AUTO: 86 FL (ref 80–98)
MCV RBC AUTO: 86.2 FL (ref 80–98)
MCV RBC AUTO: 86.5 FL (ref 80–98)
MCV RBC AUTO: 87.4 FL (ref 80–98)
MODALITY: ABNORMAL
MODALITY: ABNORMAL
MONOCYTES # BLD AUTO: 0.5 10*3/MM3 (ref 0–0.9)
MONOCYTES # BLD AUTO: 0.57 10*3/MM3 (ref 0–0.9)
MONOCYTES # BLD AUTO: 0.63 10*3/MM3 (ref 0–0.9)
MONOCYTES # BLD AUTO: 0.69 10*3/MM3 (ref 0–0.9)
MONOCYTES # BLD AUTO: 0.71 10*3/MM3 (ref 0–0.9)
MONOCYTES # BLD AUTO: 0.79 10*3/MM3 (ref 0–0.9)
MONOCYTES # BLD AUTO: 0.9 10*3/MM3 (ref 0–0.9)
MONOCYTES # BLD AUTO: 0.91 10*3/MM3 (ref 0–0.9)
MONOCYTES # BLD AUTO: 0.96 10*3/MM3 (ref 0–0.9)
MONOCYTES # BLD AUTO: 1.06 10*3/MM3 (ref 0–0.9)
MONOCYTES # BLD AUTO: 1.09 10*3/MM3 (ref 0–0.9)
MONOCYTES # BLD AUTO: 1.3 10*3/MM3 (ref 0–0.9)
MONOCYTES NFR BLD AUTO: 10 % (ref 0–12)
MONOCYTES NFR BLD AUTO: 10.1 % (ref 0–12)
MONOCYTES NFR BLD AUTO: 10.1 % (ref 0–12)
MONOCYTES NFR BLD AUTO: 11.3 % (ref 0–12)
MONOCYTES NFR BLD AUTO: 11.4 % (ref 0–12)
MONOCYTES NFR BLD AUTO: 11.5 % (ref 0–12)
MONOCYTES NFR BLD AUTO: 11.7 % (ref 0–12)
MONOCYTES NFR BLD AUTO: 12.9 % (ref 0–12)
MONOCYTES NFR BLD AUTO: 14.7 % (ref 0–12)
MONOCYTES NFR BLD AUTO: 7.8 % (ref 0–12)
MONOCYTES NFR BLD AUTO: 9.7 % (ref 0–12)
MONOCYTES NFR BLD AUTO: 9.9 % (ref 0–12)
MONOS+MACROS NFR FLD: 32 %
NEUTROPHILS # BLD AUTO: 3.43 10*3/MM3 (ref 2–8.6)
NEUTROPHILS # BLD AUTO: 3.68 10*3/MM3 (ref 2–8.6)
NEUTROPHILS # BLD AUTO: 3.76 10*3/MM3 (ref 2–8.6)
NEUTROPHILS # BLD AUTO: 4.51 10*3/MM3 (ref 2–8.6)
NEUTROPHILS # BLD AUTO: 4.55 10*3/MM3 (ref 2–8.6)
NEUTROPHILS # BLD AUTO: 5.13 10*3/MM3 (ref 2–8.6)
NEUTROPHILS # BLD AUTO: 5.23 10*3/MM3 (ref 2–8.6)
NEUTROPHILS # BLD AUTO: 5.36 10*3/MM3 (ref 2–8.6)
NEUTROPHILS # BLD AUTO: 6.31 10*3/MM3 (ref 2–8.6)
NEUTROPHILS # BLD AUTO: 6.9 10*3/MM3 (ref 2–8.6)
NEUTROPHILS # BLD AUTO: 8.28 10*3/MM3 (ref 2–8.6)
NEUTROPHILS # BLD AUTO: 8.68 10*3/MM3 (ref 2–8.6)
NEUTROPHILS NFR BLD AUTO: 61 % (ref 37–80)
NEUTROPHILS NFR BLD AUTO: 61.5 % (ref 37–80)
NEUTROPHILS NFR BLD AUTO: 65.5 % (ref 37–80)
NEUTROPHILS NFR BLD AUTO: 67.6 % (ref 37–80)
NEUTROPHILS NFR BLD AUTO: 68.9 % (ref 37–80)
NEUTROPHILS NFR BLD AUTO: 70 % (ref 37–80)
NEUTROPHILS NFR BLD AUTO: 70.1 % (ref 37–80)
NEUTROPHILS NFR BLD AUTO: 71.1 % (ref 37–80)
NEUTROPHILS NFR BLD AUTO: 71.8 % (ref 37–80)
NEUTROPHILS NFR BLD AUTO: 76.9 % (ref 37–80)
NEUTROPHILS NFR BLD AUTO: 78.5 % (ref 37–80)
NEUTROPHILS NFR BLD AUTO: 79 % (ref 37–80)
NEUTROPHILS NFR FLD MANUAL: 68 %
NRBC BLD MANUAL-RTO: 0 /100 WBC (ref 0–0)
PATH REPORT.FINAL DX SPEC: NORMAL
PATH REPORT.GROSS SPEC: NORMAL
PCO2 BLDA: 21.8 MM HG (ref 35–45)
PCO2 BLDA: 37.9 MM HG (ref 35–45)
PH BLDA: 7.49 PH UNITS (ref 7.35–7.45)
PH BLDA: 7.71 PH UNITS (ref 7.35–7.45)
PLATELET # BLD AUTO: 106 10*3/MM3 (ref 150–450)
PLATELET # BLD AUTO: 115 10*3/MM3 (ref 150–450)
PLATELET # BLD AUTO: 121 10*3/MM3 (ref 150–450)
PLATELET # BLD AUTO: 125 10*3/MM3 (ref 150–450)
PLATELET # BLD AUTO: 125 10*3/MM3 (ref 150–450)
PLATELET # BLD AUTO: 128 10*3/MM3 (ref 150–450)
PLATELET # BLD AUTO: 129 10*3/MM3 (ref 150–450)
PLATELET # BLD AUTO: 133 10*3/MM3 (ref 150–450)
PLATELET # BLD AUTO: 134 10*3/MM3 (ref 150–450)
PLATELET # BLD AUTO: 146 10*3/MM3 (ref 150–450)
PLATELET # BLD AUTO: 147 10*3/MM3 (ref 150–450)
PLATELET # BLD AUTO: 158 10*3/MM3 (ref 150–450)
PMV BLD AUTO: 10 FL (ref 8–12)
PMV BLD AUTO: 10.1 FL (ref 8–12)
PMV BLD AUTO: 10.2 FL (ref 8–12)
PMV BLD AUTO: 10.3 FL (ref 8–12)
PMV BLD AUTO: 10.4 FL (ref 8–12)
PMV BLD AUTO: 10.6 FL (ref 8–12)
PMV BLD AUTO: 11 FL (ref 8–12)
PMV BLD AUTO: 11.1 FL (ref 8–12)
PMV BLD AUTO: 9.8 FL (ref 8–12)
PMV BLD AUTO: 9.9 FL (ref 8–12)
PO2 BLDA: 114 MM HG (ref 83–108)
PO2 BLDA: 178 MM HG (ref 83–108)
POTASSIUM BLD-SCNC: 3.2 MMOL/L (ref 3.5–5.1)
POTASSIUM BLD-SCNC: 3.2 MMOL/L (ref 3.5–5.1)
POTASSIUM BLD-SCNC: 3.3 MMOL/L (ref 3.5–5.1)
POTASSIUM BLD-SCNC: 3.3 MMOL/L (ref 3.5–5.1)
POTASSIUM BLD-SCNC: 3.4 MMOL/L (ref 3.5–5.1)
POTASSIUM BLD-SCNC: 3.4 MMOL/L (ref 3.5–5.1)
POTASSIUM BLD-SCNC: 3.5 MMOL/L (ref 3.5–5.1)
POTASSIUM BLD-SCNC: 3.6 MMOL/L (ref 3.5–5.1)
POTASSIUM BLD-SCNC: 3.6 MMOL/L (ref 3.5–5.1)
POTASSIUM BLD-SCNC: 3.7 MMOL/L (ref 3.5–5.1)
POTASSIUM BLD-SCNC: 4 MMOL/L (ref 3.5–5.1)
POTASSIUM BLD-SCNC: 4.2 MMOL/L (ref 3.5–5.1)
POTASSIUM BLD-SCNC: 4.3 MMOL/L (ref 3.5–5.1)
POTASSIUM BLD-SCNC: 4.6 MMOL/L (ref 3.5–5.1)
PROT FLD-MCNC: <2 G/DL
PROT SERPL-MCNC: 5.8 G/DL (ref 6.3–8.6)
PROT SERPL-MCNC: 5.9 G/DL (ref 6.3–8.6)
PROT SERPL-MCNC: 6.1 G/DL (ref 6.3–8.6)
PROT SERPL-MCNC: 6.2 G/DL (ref 6.3–8.6)
PROT SERPL-MCNC: 6.2 G/DL (ref 6.3–8.6)
PROT SERPL-MCNC: 6.5 G/DL (ref 6.3–8.6)
PROT SERPL-MCNC: 6.5 G/DL (ref 6.3–8.6)
PROT SERPL-MCNC: 6.6 G/DL (ref 6.3–8.6)
PROT SERPL-MCNC: 6.6 G/DL (ref 6.3–8.6)
PROT SERPL-MCNC: 6.7 G/DL (ref 6.3–8.6)
PROT SERPL-MCNC: 6.7 G/DL (ref 6.3–8.6)
PROT SERPL-MCNC: 8.5 G/DL (ref 6.3–8.6)
PROTHROMBIN TIME: 16.5 SECONDS (ref 11.1–15.3)
PROTHROMBIN TIME: ABNORMAL SECONDS (ref 11.1–15.3)
RBC # BLD AUTO: 3.67 10*6/MM3 (ref 3.77–5.16)
RBC # BLD AUTO: 3.82 10*6/MM3 (ref 3.77–5.16)
RBC # BLD AUTO: 3.83 10*6/MM3 (ref 3.77–5.16)
RBC # BLD AUTO: 3.85 10*6/MM3 (ref 3.77–5.16)
RBC # BLD AUTO: 3.9 10*6/MM3 (ref 3.77–5.16)
RBC # BLD AUTO: 3.96 10*6/MM3 (ref 3.77–5.16)
RBC # BLD AUTO: 4.07 10*6/MM3 (ref 3.77–5.16)
RBC # BLD AUTO: 4.16 10*6/MM3 (ref 3.77–5.16)
RBC # BLD AUTO: 4.18 10*6/MM3 (ref 3.77–5.16)
RBC # BLD AUTO: 4.18 10*6/MM3 (ref 3.77–5.16)
RBC # BLD AUTO: 4.19 10*6/MM3 (ref 3.77–5.16)
RBC # BLD AUTO: 5 10*6/MM3 (ref 3.77–5.16)
RBC # FLD AUTO: 407 /MM3 (ref 0–0)
SAO2 % BLDCOA: 99 % (ref 94–99)
SAO2 % BLDCOA: 99.9 % (ref 94–99)
SODIUM BLD-SCNC: 131 MMOL/L (ref 137–145)
SODIUM BLD-SCNC: 132 MMOL/L (ref 137–145)
SODIUM BLD-SCNC: 132 MMOL/L (ref 137–145)
SODIUM BLD-SCNC: 133 MMOL/L (ref 137–145)
SODIUM BLD-SCNC: 133 MMOL/L (ref 137–145)
SODIUM BLD-SCNC: 134 MMOL/L (ref 137–145)
SODIUM BLD-SCNC: 135 MMOL/L (ref 137–145)
SODIUM BLD-SCNC: 136 MMOL/L (ref 137–145)
SODIUM BLD-SCNC: 137 MMOL/L (ref 137–145)
SODIUM BLD-SCNC: 138 MMOL/L (ref 137–145)
SPECIMEN STATUS: NORMAL
SPECIMEN VOL FLD: 1000 ML
STAT OF ADQ CVX/VAG CYTO-IMP: NORMAL
TEST INFORMATION: NORMAL
VENTILATOR MODE: ABNORMAL
VENTILATOR MODE: ABNORMAL
VIT B12 BLD-MCNC: 651 PG/ML (ref 239–931)
WBC # FLD: 928.5 /MM3 (ref 0–5)
WBC NRBC COR # BLD: 10.54 10*3/MM3 (ref 3.2–9.8)
WBC NRBC COR # BLD: 10.99 10*3/MM3 (ref 3.2–9.8)
WBC NRBC COR # BLD: 4.98 10*3/MM3 (ref 3.2–9.8)
WBC NRBC COR # BLD: 5.56 10*3/MM3 (ref 3.2–9.8)
WBC NRBC COR # BLD: 5.99 10*3/MM3 (ref 3.2–9.8)
WBC NRBC COR # BLD: 6.9 10*3/MM3 (ref 3.2–9.8)
WBC NRBC COR # BLD: 7.29 10*3/MM3 (ref 3.2–9.8)
WBC NRBC COR # BLD: 7.32 10*3/MM3 (ref 3.2–9.8)
WBC NRBC COR # BLD: 7.45 10*3/MM3 (ref 3.2–9.8)
WBC NRBC COR # BLD: 7.67 10*3/MM3 (ref 3.2–9.8)
WBC NRBC COR # BLD: 8.87 10*3/MM3 (ref 3.2–9.8)
WBC NRBC COR # BLD: 8.99 10*3/MM3 (ref 3.2–9.8)

## 2018-01-01 PROCEDURE — 25010000002 LEVOFLOXACIN PER 250 MG: Performed by: STUDENT IN AN ORGANIZED HEALTH CARE EDUCATION/TRAINING PROGRAM

## 2018-01-01 PROCEDURE — 94760 N-INVAS EAR/PLS OXIMETRY 1: CPT

## 2018-01-01 PROCEDURE — 93005 ELECTROCARDIOGRAM TRACING: CPT | Performed by: STUDENT IN AN ORGANIZED HEALTH CARE EDUCATION/TRAINING PROGRAM

## 2018-01-01 PROCEDURE — 99231 SBSQ HOSP IP/OBS SF/LOW 25: CPT | Performed by: INTERNAL MEDICINE

## 2018-01-01 PROCEDURE — 85025 COMPLETE CBC W/AUTO DIFF WBC: CPT | Performed by: FAMILY MEDICINE

## 2018-01-01 PROCEDURE — 99214 OFFICE O/P EST MOD 30 MIN: CPT | Performed by: INTERNAL MEDICINE

## 2018-01-01 PROCEDURE — 25010000002 LEVOFLOXACIN PER 250 MG: Performed by: FAMILY MEDICINE

## 2018-01-01 PROCEDURE — 25010000002 ONDANSETRON PER 1 MG: Performed by: FAMILY MEDICINE

## 2018-01-01 PROCEDURE — 84157 ASSAY OF PROTEIN OTHER: CPT | Performed by: FAMILY MEDICINE

## 2018-01-01 PROCEDURE — 0WBH3ZX EXCISION OF RETROPERITONEUM, PERCUTANEOUS APPROACH, DIAGNOSTIC: ICD-10-PCS | Performed by: RADIOLOGY

## 2018-01-01 PROCEDURE — 25010000002 ALBUMIN HUMAN 25% PER 50 ML: Performed by: FAMILY MEDICINE

## 2018-01-01 PROCEDURE — 99223 1ST HOSP IP/OBS HIGH 75: CPT | Performed by: INTERNAL MEDICINE

## 2018-01-01 PROCEDURE — 88305 TISSUE EXAM BY PATHOLOGIST: CPT | Performed by: FAMILY MEDICINE

## 2018-01-01 PROCEDURE — 97116 GAIT TRAINING THERAPY: CPT

## 2018-01-01 PROCEDURE — 99232 SBSQ HOSP IP/OBS MODERATE 35: CPT | Performed by: INTERNAL MEDICINE

## 2018-01-01 PROCEDURE — 76942 ECHO GUIDE FOR BIOPSY: CPT

## 2018-01-01 PROCEDURE — 85610 PROTHROMBIN TIME: CPT | Performed by: FAMILY MEDICINE

## 2018-01-01 PROCEDURE — 80053 COMPREHEN METABOLIC PANEL: CPT | Performed by: FAMILY MEDICINE

## 2018-01-01 PROCEDURE — 74181 MRI ABDOMEN W/O CONTRAST: CPT

## 2018-01-01 PROCEDURE — 92610 EVALUATE SWALLOWING FUNCTION: CPT | Performed by: SPEECH-LANGUAGE PATHOLOGIST

## 2018-01-01 PROCEDURE — 0W9G3ZX DRAINAGE OF PERITONEAL CAVITY, PERCUTANEOUS APPROACH, DIAGNOSTIC: ICD-10-PCS | Performed by: RADIOLOGY

## 2018-01-01 PROCEDURE — 88341 IMHCHEM/IMCYTCHM EA ADD ANTB: CPT | Performed by: PATHOLOGY

## 2018-01-01 PROCEDURE — 82607 VITAMIN B-12: CPT | Performed by: PSYCHIATRY & NEUROLOGY

## 2018-01-01 PROCEDURE — G8978 MOBILITY CURRENT STATUS: HCPCS

## 2018-01-01 PROCEDURE — 99213 OFFICE O/P EST LOW 20 MIN: CPT | Performed by: INTERNAL MEDICINE

## 2018-01-01 PROCEDURE — 85730 THROMBOPLASTIN TIME PARTIAL: CPT | Performed by: FAMILY MEDICINE

## 2018-01-01 PROCEDURE — 85025 COMPLETE CBC W/AUTO DIFF WBC: CPT | Performed by: INTERNAL MEDICINE

## 2018-01-01 PROCEDURE — 99226 PR SBSQ OBSERVATION CARE/DAY 35 MINUTES: CPT | Performed by: PSYCHIATRY & NEUROLOGY

## 2018-01-01 PROCEDURE — 80053 COMPREHEN METABOLIC PANEL: CPT | Performed by: INTERNAL MEDICINE

## 2018-01-01 PROCEDURE — 88313 SPECIAL STAINS GROUP 2: CPT | Performed by: INTERNAL MEDICINE

## 2018-01-01 PROCEDURE — 25010000002 MORPHINE PER 10 MG: Performed by: HOSPITALIST

## 2018-01-01 PROCEDURE — 97530 THERAPEUTIC ACTIVITIES: CPT

## 2018-01-01 PROCEDURE — 76937 US GUIDE VASCULAR ACCESS: CPT

## 2018-01-01 PROCEDURE — 93010 ELECTROCARDIOGRAM REPORT: CPT | Performed by: INTERNAL MEDICINE

## 2018-01-01 PROCEDURE — 82150 ASSAY OF AMYLASE: CPT | Performed by: FAMILY MEDICINE

## 2018-01-01 PROCEDURE — G8979 MOBILITY GOAL STATUS: HCPCS

## 2018-01-01 PROCEDURE — 88342 IMHCHEM/IMCYTCHM 1ST ANTB: CPT | Performed by: PATHOLOGY

## 2018-01-01 PROCEDURE — 87522 HEPATITIS C REVRS TRNSCRPJ: CPT | Performed by: INTERNAL MEDICINE

## 2018-01-01 PROCEDURE — 84132 ASSAY OF SERUM POTASSIUM: CPT | Performed by: STUDENT IN AN ORGANIZED HEALTH CARE EDUCATION/TRAINING PROGRAM

## 2018-01-01 PROCEDURE — 82378 CARCINOEMBRYONIC ANTIGEN: CPT | Performed by: FAMILY MEDICINE

## 2018-01-01 PROCEDURE — 97162 PT EVAL MOD COMPLEX 30 MIN: CPT

## 2018-01-01 PROCEDURE — 74176 CT ABD & PELVIS W/O CONTRAST: CPT

## 2018-01-01 PROCEDURE — 36600 WITHDRAWAL OF ARTERIAL BLOOD: CPT

## 2018-01-01 PROCEDURE — 87015 SPECIMEN INFECT AGNT CONCNTJ: CPT | Performed by: FAMILY MEDICINE

## 2018-01-01 PROCEDURE — 87070 CULTURE OTHR SPECIMN AEROBIC: CPT | Performed by: FAMILY MEDICINE

## 2018-01-01 PROCEDURE — 25010000002 NALOXONE PER 1 MG: Performed by: FAMILY MEDICINE

## 2018-01-01 PROCEDURE — 82803 BLOOD GASES ANY COMBINATION: CPT

## 2018-01-01 PROCEDURE — 0DJD8ZZ INSPECTION OF LOWER INTESTINAL TRACT, VIA NATURAL OR ARTIFICIAL OPENING ENDOSCOPIC: ICD-10-PCS | Performed by: INTERNAL MEDICINE

## 2018-01-01 PROCEDURE — 45330 DIAGNOSTIC SIGMOIDOSCOPY: CPT | Performed by: INTERNAL MEDICINE

## 2018-01-01 PROCEDURE — G8987 SELF CARE CURRENT STATUS: HCPCS

## 2018-01-01 PROCEDURE — 25010000002 PROPOFOL 10 MG/ML EMULSION: Performed by: NURSE ANESTHETIST, CERTIFIED REGISTERED

## 2018-01-01 PROCEDURE — 86304 IMMUNOASSAY TUMOR CA 125: CPT | Performed by: INTERNAL MEDICINE

## 2018-01-01 PROCEDURE — 88112 CYTOPATH CELL ENHANCE TECH: CPT | Performed by: FAMILY MEDICINE

## 2018-01-01 PROCEDURE — G8998 SWALLOW D/C STATUS: HCPCS | Performed by: SPEECH-LANGUAGE PATHOLOGIST

## 2018-01-01 PROCEDURE — 87075 CULTR BACTERIA EXCEPT BLOOD: CPT | Performed by: FAMILY MEDICINE

## 2018-01-01 PROCEDURE — 82746 ASSAY OF FOLIC ACID SERUM: CPT | Performed by: PSYCHIATRY & NEUROLOGY

## 2018-01-01 PROCEDURE — G0378 HOSPITAL OBSERVATION PER HR: HCPCS

## 2018-01-01 PROCEDURE — 88305 TISSUE EXAM BY PATHOLOGIST: CPT | Performed by: INTERNAL MEDICINE

## 2018-01-01 PROCEDURE — 88305 TISSUE EXAM BY PATHOLOGIST: CPT | Performed by: PATHOLOGY

## 2018-01-01 PROCEDURE — 25010000002 FENTANYL CITRATE (PF) 100 MCG/2ML SOLUTION: Performed by: RADIOLOGY

## 2018-01-01 PROCEDURE — G8996 SWALLOW CURRENT STATUS: HCPCS | Performed by: SPEECH-LANGUAGE PATHOLOGIST

## 2018-01-01 PROCEDURE — 88173 CYTOPATH EVAL FNA REPORT: CPT | Performed by: INTERNAL MEDICINE

## 2018-01-01 PROCEDURE — 88342 IMHCHEM/IMCYTCHM 1ST ANTB: CPT | Performed by: INTERNAL MEDICINE

## 2018-01-01 PROCEDURE — 99232 SBSQ HOSP IP/OBS MODERATE 35: CPT | Performed by: PSYCHIATRY & NEUROLOGY

## 2018-01-01 PROCEDURE — 88104 CYTOPATH FL NONGYN SMEARS: CPT | Performed by: FAMILY MEDICINE

## 2018-01-01 PROCEDURE — 82945 GLUCOSE OTHER FLUID: CPT | Performed by: FAMILY MEDICINE

## 2018-01-01 PROCEDURE — 99225 PR SBSQ OBSERVATION CARE/DAY 25 MINUTES: CPT | Performed by: PSYCHIATRY & NEUROLOGY

## 2018-01-01 PROCEDURE — 36415 COLL VENOUS BLD VENIPUNCTURE: CPT | Performed by: INTERNAL MEDICINE

## 2018-01-01 PROCEDURE — 85730 THROMBOPLASTIN TIME PARTIAL: CPT | Performed by: INTERNAL MEDICINE

## 2018-01-01 PROCEDURE — 97164 PT RE-EVAL EST PLAN CARE: CPT

## 2018-01-01 PROCEDURE — G8997 SWALLOW GOAL STATUS: HCPCS | Performed by: SPEECH-LANGUAGE PATHOLOGIST

## 2018-01-01 PROCEDURE — 82042 OTHER SOURCE ALBUMIN QUAN EA: CPT | Performed by: FAMILY MEDICINE

## 2018-01-01 PROCEDURE — 86301 IMMUNOASSAY TUMOR CA 19-9: CPT | Performed by: INTERNAL MEDICINE

## 2018-01-01 PROCEDURE — 94799 UNLISTED PULMONARY SVC/PX: CPT

## 2018-01-01 PROCEDURE — 83615 LACTATE (LD) (LDH) ENZYME: CPT | Performed by: FAMILY MEDICINE

## 2018-01-01 PROCEDURE — 76700 US EXAM ABDOM COMPLETE: CPT

## 2018-01-01 PROCEDURE — 43239 EGD BIOPSY SINGLE/MULTIPLE: CPT | Performed by: INTERNAL MEDICINE

## 2018-01-01 PROCEDURE — 87205 SMEAR GRAM STAIN: CPT | Performed by: FAMILY MEDICINE

## 2018-01-01 PROCEDURE — 97535 SELF CARE MNGMENT TRAINING: CPT

## 2018-01-01 PROCEDURE — 88341 IMHCHEM/IMCYTCHM EA ADD ANTB: CPT | Performed by: INTERNAL MEDICINE

## 2018-01-01 PROCEDURE — G8988 SELF CARE GOAL STATUS: HCPCS

## 2018-01-01 PROCEDURE — 85610 PROTHROMBIN TIME: CPT | Performed by: INTERNAL MEDICINE

## 2018-01-01 PROCEDURE — 77012 CT SCAN FOR NEEDLE BIOPSY: CPT

## 2018-01-01 PROCEDURE — 25010000003 POTASSIUM CHLORIDE 10 MEQ/100ML SOLUTION: Performed by: FAMILY MEDICINE

## 2018-01-01 PROCEDURE — 88313 SPECIAL STAINS GROUP 2: CPT | Performed by: PATHOLOGY

## 2018-01-01 PROCEDURE — 84132 ASSAY OF SERUM POTASSIUM: CPT | Performed by: FAMILY MEDICINE

## 2018-01-01 PROCEDURE — 89051 BODY FLUID CELL COUNT: CPT | Performed by: FAMILY MEDICINE

## 2018-01-01 PROCEDURE — 25010000002 MIDAZOLAM PER 1 MG: Performed by: RADIOLOGY

## 2018-01-01 PROCEDURE — 82140 ASSAY OF AMMONIA: CPT | Performed by: PSYCHIATRY & NEUROLOGY

## 2018-01-01 PROCEDURE — 88173 CYTOPATH EVAL FNA REPORT: CPT | Performed by: PATHOLOGY

## 2018-01-01 PROCEDURE — 82105 ALPHA-FETOPROTEIN SERUM: CPT | Performed by: INTERNAL MEDICINE

## 2018-01-01 PROCEDURE — 97166 OT EVAL MOD COMPLEX 45 MIN: CPT

## 2018-01-01 PROCEDURE — 82247 BILIRUBIN TOTAL: CPT | Performed by: FAMILY MEDICINE

## 2018-01-01 PROCEDURE — 0 DIATRIZOATE MEGLUMINE & SODIUM PER 1 ML: Performed by: FAMILY MEDICINE

## 2018-01-01 PROCEDURE — C1751 CATH, INF, PER/CENT/MIDLINE: HCPCS

## 2018-01-01 PROCEDURE — 88112 CYTOPATH CELL ENHANCE TECH: CPT | Performed by: PATHOLOGY

## 2018-01-01 PROCEDURE — P9047 ALBUMIN (HUMAN), 25%, 50ML: HCPCS | Performed by: FAMILY MEDICINE

## 2018-01-01 PROCEDURE — 99212 OFFICE O/P EST SF 10 MIN: CPT | Performed by: INTERNAL MEDICINE

## 2018-01-01 PROCEDURE — 87220 TISSUE EXAM FOR FUNGI: CPT | Performed by: INTERNAL MEDICINE

## 2018-01-01 RX ORDER — MORPHINE SULFATE 20 MG/ML
SOLUTION ORAL
Qty: 60 ML | Refills: 0 | Status: SHIPPED | OUTPATIENT
Start: 2018-01-01

## 2018-01-01 RX ORDER — TRIAMCINOLONE ACETONIDE 1 MG/G
1 CREAM TOPICAL AS NEEDED
Refills: 0 | COMMUNITY
Start: 2018-01-01

## 2018-01-01 RX ORDER — NALOXONE HYDROCHLORIDE 0.4 MG/ML
INJECTION, SOLUTION INTRAMUSCULAR; INTRAVENOUS; SUBCUTANEOUS
Status: DISPENSED
Start: 2018-01-01 | End: 2018-01-01

## 2018-01-01 RX ORDER — CARBOXYMETHYLCELLULOSE SODIUM 5 MG/ML
2 SOLUTION/ DROPS OPHTHALMIC 4 TIMES DAILY
Status: DISCONTINUED | OUTPATIENT
Start: 2018-01-01 | End: 2018-01-01 | Stop reason: CLARIF

## 2018-01-01 RX ORDER — TRAZODONE HYDROCHLORIDE 50 MG/1
TABLET ORAL
COMMUNITY
Start: 2018-01-01 | End: 2018-01-01 | Stop reason: HOSPADM

## 2018-01-01 RX ORDER — POTASSIUM CHLORIDE 7.45 MG/ML
10 INJECTION INTRAVENOUS
Status: DISCONTINUED | OUTPATIENT
Start: 2018-01-01 | End: 2018-01-01 | Stop reason: HOSPADM

## 2018-01-01 RX ORDER — GABAPENTIN 400 MG/1
800 CAPSULE ORAL EVERY 8 HOURS SCHEDULED
Status: DISCONTINUED | OUTPATIENT
Start: 2018-01-01 | End: 2018-01-01

## 2018-01-01 RX ORDER — CITALOPRAM 20 MG/1
TABLET ORAL
Refills: 2 | COMMUNITY
Start: 2017-12-11

## 2018-01-01 RX ORDER — ESOMEPRAZOLE MAGNESIUM 40 MG/1
40 CAPSULE, DELAYED RELEASE ORAL
Qty: 30 CAPSULE | Refills: 5 | Status: SHIPPED | OUTPATIENT
Start: 2018-01-01 | End: 2018-01-01 | Stop reason: HOSPADM

## 2018-01-01 RX ORDER — NALOXONE HYDROCHLORIDE 0.4 MG/ML
0.2 INJECTION, SOLUTION INTRAMUSCULAR; INTRAVENOUS; SUBCUTANEOUS ONCE
Status: COMPLETED | OUTPATIENT
Start: 2018-01-01 | End: 2018-01-01

## 2018-01-01 RX ORDER — PROMETHAZINE HYDROCHLORIDE 25 MG/1
25 TABLET ORAL EVERY 6 HOURS PRN
Qty: 20 TABLET | Refills: 0 | Status: SHIPPED | OUTPATIENT
Start: 2018-01-01 | End: 2018-01-01 | Stop reason: SDUPTHER

## 2018-01-01 RX ORDER — HYDROCODONE BITARTRATE AND ACETAMINOPHEN 5; 325 MG/1; MG/1
1 TABLET ORAL EVERY 6 HOURS PRN
Status: DISCONTINUED | OUTPATIENT
Start: 2018-01-01 | End: 2018-01-01

## 2018-01-01 RX ORDER — MORPHINE SULFATE 15 MG/1
15 TABLET, FILM COATED, EXTENDED RELEASE ORAL EVERY 12 HOURS
Qty: 8 TABLET | Refills: 0 | Status: SHIPPED | OUTPATIENT
Start: 2018-01-01 | End: 2018-01-01 | Stop reason: SDUPTHER

## 2018-01-01 RX ORDER — NICOTINE 21 MG/24HR
1 PATCH, TRANSDERMAL 24 HOURS TRANSDERMAL
Qty: 21 PATCH | Refills: 0 | Status: SHIPPED | OUTPATIENT
Start: 2018-01-01

## 2018-01-01 RX ORDER — ALBUMIN (HUMAN) 12.5 G/50ML
25 SOLUTION INTRAVENOUS ONCE
Status: CANCELLED | OUTPATIENT
Start: 2018-01-01 | End: 2018-01-01

## 2018-01-01 RX ORDER — FENTANYL CITRATE 50 UG/ML
INJECTION, SOLUTION INTRAMUSCULAR; INTRAVENOUS
Status: COMPLETED | OUTPATIENT
Start: 2018-01-01 | End: 2018-01-01

## 2018-01-01 RX ORDER — GABAPENTIN 600 MG/1
TABLET ORAL
Refills: 0 | COMMUNITY
Start: 2017-12-07 | End: 2018-01-01 | Stop reason: DRUGHIGH

## 2018-01-01 RX ORDER — GABAPENTIN 800 MG/1
800 TABLET ORAL 3 TIMES DAILY
COMMUNITY

## 2018-01-01 RX ORDER — DICYCLOMINE HYDROCHLORIDE 10 MG/1
10 CAPSULE ORAL 2 TIMES DAILY
Status: DISCONTINUED | OUTPATIENT
Start: 2018-01-01 | End: 2018-01-01

## 2018-01-01 RX ORDER — OXYCODONE HYDROCHLORIDE 5 MG/1
5 CAPSULE ORAL EVERY 4 HOURS PRN
Qty: 50 CAPSULE | Refills: 0 | OUTPATIENT
Start: 2018-01-01 | End: 2018-01-01 | Stop reason: HOSPADM

## 2018-01-01 RX ORDER — DEXTROSE AND SODIUM CHLORIDE 5; .9 G/100ML; G/100ML
50 INJECTION, SOLUTION INTRAVENOUS CONTINUOUS
Status: DISCONTINUED | OUTPATIENT
Start: 2018-01-01 | End: 2018-01-01 | Stop reason: HOSPADM

## 2018-01-01 RX ORDER — ONDANSETRON 4 MG/1
TABLET, ORALLY DISINTEGRATING ORAL
Refills: 1 | COMMUNITY
Start: 2017-12-06

## 2018-01-01 RX ORDER — LACTULOSE 10 G/15ML
30 SOLUTION ORAL DAILY
Qty: 946 ML | Refills: 0 | Status: SHIPPED | OUTPATIENT
Start: 2018-01-01 | End: 2018-12-22

## 2018-01-01 RX ORDER — DULOXETIN HYDROCHLORIDE 30 MG/1
30 CAPSULE, DELAYED RELEASE ORAL DAILY
Status: ON HOLD | COMMUNITY
End: 2018-01-01

## 2018-01-01 RX ORDER — LORAZEPAM 0.5 MG/1
0.5 TABLET ORAL 2 TIMES DAILY PRN
Status: DISCONTINUED | OUTPATIENT
Start: 2018-01-01 | End: 2018-01-01 | Stop reason: HOSPADM

## 2018-01-01 RX ORDER — LORAZEPAM 0.5 MG/1
0.5 TABLET ORAL EVERY 12 HOURS SCHEDULED
Qty: 8 TABLET | Refills: 0 | Status: SHIPPED | OUTPATIENT
Start: 2018-01-01 | End: 2018-01-01

## 2018-01-01 RX ORDER — MIDAZOLAM HYDROCHLORIDE 1 MG/ML
INJECTION INTRAMUSCULAR; INTRAVENOUS
Status: COMPLETED | OUTPATIENT
Start: 2018-01-01 | End: 2018-01-01

## 2018-01-01 RX ORDER — POTASSIUM CHLORIDE 1.5 G/1.77G
40 POWDER, FOR SOLUTION ORAL AS NEEDED
Status: DISCONTINUED | OUTPATIENT
Start: 2018-01-01 | End: 2018-01-01 | Stop reason: HOSPADM

## 2018-01-01 RX ORDER — PANTOPRAZOLE SODIUM 40 MG/1
40 TABLET, DELAYED RELEASE ORAL DAILY
Qty: 28 TABLET | Refills: 0 | Status: SHIPPED | OUTPATIENT
Start: 2018-01-01 | End: 2018-12-27

## 2018-01-01 RX ORDER — TRAZODONE HYDROCHLORIDE 50 MG/1
50 TABLET ORAL NIGHTLY
Status: DISCONTINUED | OUTPATIENT
Start: 2018-01-01 | End: 2018-01-01

## 2018-01-01 RX ORDER — GABAPENTIN 400 MG/1
800 CAPSULE ORAL 3 TIMES DAILY
Status: DISCONTINUED | OUTPATIENT
Start: 2018-01-01 | End: 2018-01-01 | Stop reason: HOSPADM

## 2018-01-01 RX ORDER — PROMETHAZINE HYDROCHLORIDE 25 MG/1
25 TABLET ORAL EVERY 6 HOURS PRN
Qty: 60 TABLET | Refills: 5 | Status: SHIPPED | OUTPATIENT
Start: 2018-01-01 | End: 2018-01-01 | Stop reason: SDUPTHER

## 2018-01-01 RX ORDER — SODIUM CHLORIDE 9 MG/ML
INJECTION, SOLUTION INTRAVENOUS
Status: DISPENSED
Start: 2018-01-01 | End: 2018-01-01

## 2018-01-01 RX ORDER — LORAZEPAM 1 MG/1
1 TABLET ORAL EVERY 8 HOURS PRN
Status: DISCONTINUED | OUTPATIENT
Start: 2018-01-01 | End: 2018-01-01

## 2018-01-01 RX ORDER — SODIUM CHLORIDE 9 MG/ML
INJECTION, SOLUTION INTRAVENOUS CONTINUOUS PRN
Status: DISCONTINUED | OUTPATIENT
Start: 2018-01-01 | End: 2018-01-01 | Stop reason: SURG

## 2018-01-01 RX ORDER — CLONAZEPAM 0.5 MG/1
TABLET ORAL
COMMUNITY
Start: 2018-01-01 | End: 2018-01-01 | Stop reason: DRUGHIGH

## 2018-01-01 RX ORDER — OXYCODONE HYDROCHLORIDE 5 MG/1
5 TABLET ORAL EVERY 4 HOURS PRN
Status: DISCONTINUED | OUTPATIENT
Start: 2018-01-01 | End: 2018-01-01 | Stop reason: HOSPADM

## 2018-01-01 RX ORDER — FLUCONAZOLE 100 MG/1
100 TABLET ORAL DAILY
Qty: 14 TABLET | Refills: 0 | Status: SHIPPED | OUTPATIENT
Start: 2018-01-01 | End: 2018-01-01 | Stop reason: HOSPADM

## 2018-01-01 RX ORDER — PROPOFOL 10 MG/ML
VIAL (ML) INTRAVENOUS AS NEEDED
Status: DISCONTINUED | OUTPATIENT
Start: 2018-01-01 | End: 2018-01-01 | Stop reason: SURG

## 2018-01-01 RX ORDER — MEGESTROL ACETATE 40 MG/ML
200 SUSPENSION ORAL DAILY
Qty: 240 ML | Refills: 0 | Status: SHIPPED | OUTPATIENT
Start: 2018-01-01

## 2018-01-01 RX ORDER — LEVOFLOXACIN 5 MG/ML
750 INJECTION, SOLUTION INTRAVENOUS EVERY 24 HOURS
Status: COMPLETED | OUTPATIENT
Start: 2018-01-01 | End: 2018-01-01

## 2018-01-01 RX ORDER — PANTOPRAZOLE SODIUM 40 MG/10ML
40 INJECTION, POWDER, LYOPHILIZED, FOR SOLUTION INTRAVENOUS
Status: DISCONTINUED | OUTPATIENT
Start: 2018-01-01 | End: 2018-01-01 | Stop reason: HOSPADM

## 2018-01-01 RX ORDER — MEGESTROL ACETATE 40 MG/ML
200 SUSPENSION ORAL DAILY
Status: DISCONTINUED | OUTPATIENT
Start: 2018-01-01 | End: 2018-01-01 | Stop reason: HOSPADM

## 2018-01-01 RX ORDER — MIRTAZAPINE 15 MG/1
15 TABLET, FILM COATED ORAL NIGHTLY
Status: DISCONTINUED | OUTPATIENT
Start: 2018-01-01 | End: 2018-01-01 | Stop reason: HOSPADM

## 2018-01-01 RX ORDER — BUPRENORPHINE HYDROCHLORIDE AND NALOXONE HYDROCHLORIDE DIHYDRATE 8; 2 MG/1; MG/1
1 TABLET SUBLINGUAL 3 TIMES DAILY
Status: DISCONTINUED | OUTPATIENT
Start: 2018-01-01 | End: 2018-01-01

## 2018-01-01 RX ORDER — OXYCODONE HYDROCHLORIDE 5 MG/1
5 TABLET ORAL EVERY 4 HOURS PRN
Qty: 4 TABLET | Refills: 0 | Status: SHIPPED | OUTPATIENT
Start: 2018-01-01

## 2018-01-01 RX ORDER — LORAZEPAM 0.5 MG/1
0.5 TABLET ORAL EVERY 12 HOURS SCHEDULED
Status: DISCONTINUED | OUTPATIENT
Start: 2018-01-01 | End: 2018-01-01 | Stop reason: HOSPADM

## 2018-01-01 RX ORDER — POLYVINYL ALCOHOL 14 MG/ML
2 SOLUTION/ DROPS OPHTHALMIC 4 TIMES DAILY
Qty: 15 ML | Refills: 0 | Status: SHIPPED | OUTPATIENT
Start: 2018-01-01

## 2018-01-01 RX ORDER — LACTULOSE 10 G/15ML
30 SOLUTION ORAL DAILY
Status: DISCONTINUED | OUTPATIENT
Start: 2018-01-01 | End: 2018-01-01 | Stop reason: HOSPADM

## 2018-01-01 RX ORDER — FUROSEMIDE 20 MG/1
TABLET ORAL
Refills: 5 | COMMUNITY
Start: 2017-11-29 | End: 2018-01-01 | Stop reason: HOSPADM

## 2018-01-01 RX ORDER — OXYCODONE HYDROCHLORIDE AND ACETAMINOPHEN 5; 325 MG/1; MG/1
1 TABLET ORAL EVERY 6 HOURS PRN
Status: DISCONTINUED | OUTPATIENT
Start: 2018-01-01 | End: 2018-01-01

## 2018-01-01 RX ORDER — SODIUM CHLORIDE 0.9 % (FLUSH) 0.9 %
3-10 SYRINGE (ML) INJECTION AS NEEDED
Status: DISCONTINUED | OUTPATIENT
Start: 2018-01-01 | End: 2018-01-01 | Stop reason: HOSPADM

## 2018-01-01 RX ORDER — NICOTINE 21 MG/24HR
1 PATCH, TRANSDERMAL 24 HOURS TRANSDERMAL
Status: DISCONTINUED | OUTPATIENT
Start: 2018-01-01 | End: 2018-01-01 | Stop reason: HOSPADM

## 2018-01-01 RX ORDER — LIDOCAINE HYDROCHLORIDE 10 MG/ML
INJECTION, SOLUTION INFILTRATION; PERINEURAL AS NEEDED
Status: DISCONTINUED | OUTPATIENT
Start: 2018-01-01 | End: 2018-01-01 | Stop reason: SURG

## 2018-01-01 RX ORDER — DEXTROSE AND SODIUM CHLORIDE 5; .45 G/100ML; G/100ML
30 INJECTION, SOLUTION INTRAVENOUS CONTINUOUS PRN
Status: DISCONTINUED | OUTPATIENT
Start: 2018-01-01 | End: 2018-01-01 | Stop reason: HOSPADM

## 2018-01-01 RX ORDER — RANITIDINE 150 MG/1
150 TABLET ORAL AS NEEDED
Refills: 0 | COMMUNITY
Start: 2018-01-01

## 2018-01-01 RX ORDER — DICYCLOMINE HYDROCHLORIDE 10 MG/1
10 CAPSULE ORAL 3 TIMES DAILY
Qty: 120 CAPSULE | Refills: 0 | Status: SHIPPED | OUTPATIENT
Start: 2018-01-01 | End: 2018-01-01 | Stop reason: HOSPADM

## 2018-01-01 RX ORDER — MORPHINE SULFATE 2 MG/ML
0.5 INJECTION, SOLUTION INTRAMUSCULAR; INTRAVENOUS ONCE
Status: COMPLETED | OUTPATIENT
Start: 2018-01-01 | End: 2018-01-01

## 2018-01-01 RX ORDER — DEXTROSE AND SODIUM CHLORIDE 5; .45 G/100ML; G/100ML
30 INJECTION, SOLUTION INTRAVENOUS CONTINUOUS PRN
Status: CANCELLED | OUTPATIENT
Start: 2018-01-01

## 2018-01-01 RX ORDER — MORPHINE SULFATE 20 MG/ML
SOLUTION ORAL
Qty: 30 ML | Refills: 0 | Status: SHIPPED | OUTPATIENT
Start: 2018-01-01 | End: 2018-01-01 | Stop reason: SDUPTHER

## 2018-01-01 RX ORDER — CLONAZEPAM 1 MG/1
TABLET ORAL
Refills: 0 | COMMUNITY
Start: 2018-01-01 | End: 2018-01-01 | Stop reason: HOSPADM

## 2018-01-01 RX ORDER — ONDANSETRON 2 MG/ML
4 INJECTION INTRAMUSCULAR; INTRAVENOUS EVERY 6 HOURS PRN
Status: DISCONTINUED | OUTPATIENT
Start: 2018-01-01 | End: 2018-01-01 | Stop reason: HOSPADM

## 2018-01-01 RX ORDER — POTASSIUM CHLORIDE 750 MG/1
40 CAPSULE, EXTENDED RELEASE ORAL AS NEEDED
Status: DISCONTINUED | OUTPATIENT
Start: 2018-01-01 | End: 2018-01-01 | Stop reason: HOSPADM

## 2018-01-01 RX ORDER — ALBUMIN (HUMAN) 12.5 G/50ML
12.5 SOLUTION INTRAVENOUS ONCE
Status: COMPLETED | OUTPATIENT
Start: 2018-01-01 | End: 2018-01-01

## 2018-01-01 RX ORDER — POLYVINYL ALCOHOL 14 MG/ML
2 SOLUTION/ DROPS OPHTHALMIC 4 TIMES DAILY
Status: DISCONTINUED | OUTPATIENT
Start: 2018-01-01 | End: 2018-01-01 | Stop reason: HOSPADM

## 2018-01-01 RX ORDER — CLONAZEPAM 0.5 MG/1
1 TABLET ORAL EVERY 8 HOURS PRN
Status: DISCONTINUED | OUTPATIENT
Start: 2018-01-01 | End: 2018-01-01

## 2018-01-01 RX ORDER — CITALOPRAM 20 MG/1
20 TABLET ORAL DAILY
Status: DISCONTINUED | OUTPATIENT
Start: 2018-01-01 | End: 2018-01-01 | Stop reason: HOSPADM

## 2018-01-01 RX ORDER — PROMETHAZINE HYDROCHLORIDE 25 MG/1
25 TABLET ORAL EVERY 6 HOURS PRN
Qty: 20 TABLET | Refills: 0 | Status: SHIPPED | OUTPATIENT
Start: 2018-01-01

## 2018-01-01 RX ORDER — MIRTAZAPINE 15 MG/1
15 TABLET, FILM COATED ORAL NIGHTLY
Qty: 30 TABLET | Refills: 0 | Status: SHIPPED | OUTPATIENT
Start: 2018-01-01 | End: 2018-12-29

## 2018-01-01 RX ORDER — MORPHINE SULFATE 15 MG/1
15 TABLET, FILM COATED, EXTENDED RELEASE ORAL EVERY 12 HOURS
Qty: 60 TABLET | Refills: 0 | Status: SHIPPED | OUTPATIENT
Start: 2018-01-01

## 2018-01-01 RX ORDER — LORAZEPAM 0.5 MG/1
0.5 TABLET ORAL 2 TIMES DAILY PRN
Qty: 2 TABLET | Refills: 0 | Status: SHIPPED | OUTPATIENT
Start: 2018-01-01 | End: 2018-01-01 | Stop reason: HOSPADM

## 2018-01-01 RX ORDER — SODIUM CHLORIDE 0.9 % (FLUSH) 0.9 %
3 SYRINGE (ML) INJECTION EVERY 12 HOURS SCHEDULED
Status: DISCONTINUED | OUTPATIENT
Start: 2018-01-01 | End: 2018-01-01 | Stop reason: HOSPADM

## 2018-01-01 RX ORDER — NALOXONE HYDROCHLORIDE 0.4 MG/ML
0.2 INJECTION, SOLUTION INTRAMUSCULAR; INTRAVENOUS; SUBCUTANEOUS ONCE
Status: DISCONTINUED | OUTPATIENT
Start: 2018-01-01 | End: 2018-01-01 | Stop reason: HOSPADM

## 2018-01-01 RX ADMIN — HYDROCODONE BITARTRATE AND ACETAMINOPHEN 1 TABLET: 5; 325 TABLET ORAL at 18:05

## 2018-01-01 RX ADMIN — HYDROCODONE BITARTRATE AND ACETAMINOPHEN 1 TABLET: 5; 325 TABLET ORAL at 08:23

## 2018-01-01 RX ADMIN — DIATRIZOATE MEGLUMINE AND DIATRIZOATE SODIUM 30 ML: 660; 100 LIQUID ORAL; RECTAL at 09:43

## 2018-01-01 RX ADMIN — CITALOPRAM HYDROBROMIDE 20 MG: 20 TABLET ORAL at 09:01

## 2018-01-01 RX ADMIN — MIRTAZAPINE 15 MG: 15 TABLET, FILM COATED ORAL at 21:01

## 2018-01-01 RX ADMIN — HYDROCORTISONE: 1 CREAM TOPICAL at 01:15

## 2018-01-01 RX ADMIN — HYDROCORTISONE: 1 CREAM TOPICAL at 08:36

## 2018-01-01 RX ADMIN — LEVOFLOXACIN 750 MG: 5 INJECTION, SOLUTION INTRAVENOUS at 01:25

## 2018-01-01 RX ADMIN — CARBOXYMETHYLCELLULOSE SODIUM 2 DROP: 5 SOLUTION/ DROPS OPHTHALMIC at 08:34

## 2018-01-01 RX ADMIN — DICYCLOMINE HYDROCHLORIDE 10 MG: 10 CAPSULE ORAL at 09:01

## 2018-01-01 RX ADMIN — ONDANSETRON HYDROCHLORIDE 4 MG: 2 INJECTION, SOLUTION INTRAMUSCULAR; INTRAVENOUS at 17:40

## 2018-01-01 RX ADMIN — HYDROCORTISONE: 1 CREAM TOPICAL at 08:25

## 2018-01-01 RX ADMIN — POTASSIUM CHLORIDE 10 MEQ: 7.46 INJECTION, SOLUTION INTRAVENOUS at 13:01

## 2018-01-01 RX ADMIN — HYDROCODONE BITARTRATE AND ACETAMINOPHEN 1 TABLET: 5; 325 TABLET ORAL at 10:33

## 2018-01-01 RX ADMIN — CARBOXYMETHYLCELLULOSE SODIUM 2 DROP: 5 SOLUTION/ DROPS OPHTHALMIC at 13:31

## 2018-01-01 RX ADMIN — HYDROCORTISONE: 1 CREAM TOPICAL at 16:30

## 2018-01-01 RX ADMIN — LEVOFLOXACIN 750 MG: 5 INJECTION, SOLUTION INTRAVENOUS at 01:18

## 2018-01-01 RX ADMIN — GABAPENTIN 800 MG: 400 CAPSULE ORAL at 21:00

## 2018-01-01 RX ADMIN — DICYCLOMINE HYDROCHLORIDE 10 MG: 10 CAPSULE ORAL at 11:28

## 2018-01-01 RX ADMIN — BUPRENORPHINE HYDROCHLORIDE AND NALOXONE HYDROCHLORIDE DIHYDRATE 1 TABLET: 8; 2 TABLET SUBLINGUAL at 11:28

## 2018-01-01 RX ADMIN — LEVOFLOXACIN 750 MG: 5 INJECTION, SOLUTION INTRAVENOUS at 01:50

## 2018-01-01 RX ADMIN — LEVOFLOXACIN 750 MG: 5 INJECTION, SOLUTION INTRAVENOUS at 02:22

## 2018-01-01 RX ADMIN — CITALOPRAM HYDROBROMIDE 20 MG: 20 TABLET ORAL at 08:34

## 2018-01-01 RX ADMIN — OXYCODONE HYDROCHLORIDE 5 MG: 5 TABLET ORAL at 06:04

## 2018-01-01 RX ADMIN — GABAPENTIN 800 MG: 400 CAPSULE ORAL at 09:42

## 2018-01-01 RX ADMIN — LORAZEPAM 0.5 MG: 0.5 TABLET ORAL at 21:00

## 2018-01-01 RX ADMIN — DICYCLOMINE HYDROCHLORIDE 10 MG: 10 CAPSULE ORAL at 20:57

## 2018-01-01 RX ADMIN — HYDROCODONE BITARTRATE AND ACETAMINOPHEN 1 TABLET: 5; 325 TABLET ORAL at 04:28

## 2018-01-01 RX ADMIN — FENTANYL CITRATE 25 MCG: 50 INJECTION, SOLUTION INTRAMUSCULAR; INTRAVENOUS at 10:04

## 2018-01-01 RX ADMIN — PROPOFOL 60 MG: 10 INJECTION, EMULSION INTRAVENOUS at 15:47

## 2018-01-01 RX ADMIN — MEGESTROL ACETATE 200 MG: 40 SUSPENSION ORAL at 08:19

## 2018-01-01 RX ADMIN — GABAPENTIN 800 MG: 400 CAPSULE ORAL at 13:31

## 2018-01-01 RX ADMIN — CARBOXYMETHYLCELLULOSE SODIUM 2 DROP: 5 SOLUTION/ DROPS OPHTHALMIC at 20:58

## 2018-01-01 RX ADMIN — POTASSIUM CHLORIDE 40 MEQ: 1.5 POWDER, FOR SOLUTION ORAL at 17:29

## 2018-01-01 RX ADMIN — POLYVINYL ALCOHOL 2 DROP: 14 SOLUTION/ DROPS OPHTHALMIC at 20:14

## 2018-01-01 RX ADMIN — BUPRENORPHINE HYDROCHLORIDE AND NALOXONE HYDROCHLORIDE DIHYDRATE 1 TABLET: 8; 2 TABLET SUBLINGUAL at 20:57

## 2018-01-01 RX ADMIN — DEXTROSE AND SODIUM CHLORIDE 50 ML/HR: 5; 900 INJECTION, SOLUTION INTRAVENOUS at 01:52

## 2018-01-01 RX ADMIN — GABAPENTIN 800 MG: 400 CAPSULE ORAL at 16:22

## 2018-01-01 RX ADMIN — SODIUM CHLORIDE: 9 INJECTION, SOLUTION INTRAVENOUS at 15:47

## 2018-01-01 RX ADMIN — LORAZEPAM 0.5 MG: 0.5 TABLET ORAL at 08:35

## 2018-01-01 RX ADMIN — ONDANSETRON HYDROCHLORIDE 4 MG: 2 INJECTION, SOLUTION INTRAMUSCULAR; INTRAVENOUS at 22:39

## 2018-01-01 RX ADMIN — SODIUM CHLORIDE, PRESERVATIVE FREE 3 ML: 5 INJECTION INTRAVENOUS at 20:59

## 2018-01-01 RX ADMIN — POLYVINYL ALCOHOL 2 DROP: 14 SOLUTION/ DROPS OPHTHALMIC at 07:12

## 2018-01-01 RX ADMIN — DEXTROSE AND SODIUM CHLORIDE 50 ML/HR: 5; 900 INJECTION, SOLUTION INTRAVENOUS at 00:38

## 2018-01-01 RX ADMIN — SODIUM CHLORIDE, PRESERVATIVE FREE 10 ML: 5 INJECTION INTRAVENOUS at 08:19

## 2018-01-01 RX ADMIN — HYDROCODONE BITARTRATE AND ACETAMINOPHEN 1 TABLET: 5; 325 TABLET ORAL at 07:12

## 2018-01-01 RX ADMIN — POTASSIUM CHLORIDE 10 MEQ: 7.46 INJECTION, SOLUTION INTRAVENOUS at 09:02

## 2018-01-01 RX ADMIN — MIRTAZAPINE 15 MG: 15 TABLET, FILM COATED ORAL at 20:33

## 2018-01-01 RX ADMIN — CARBOXYMETHYLCELLULOSE SODIUM 2 DROP: 5 SOLUTION/ DROPS OPHTHALMIC at 21:00

## 2018-01-01 RX ADMIN — CARBOXYMETHYLCELLULOSE SODIUM 2 DROP: 5 SOLUTION/ DROPS OPHTHALMIC at 08:32

## 2018-01-01 RX ADMIN — LACTULOSE 30 G: 10 SOLUTION ORAL at 08:35

## 2018-01-01 RX ADMIN — LEVOFLOXACIN 750 MG: 5 INJECTION, SOLUTION INTRAVENOUS at 01:10

## 2018-01-01 RX ADMIN — DICYCLOMINE HYDROCHLORIDE 10 MG: 10 CAPSULE ORAL at 08:32

## 2018-01-01 RX ADMIN — DEXTROSE AND SODIUM CHLORIDE 50 ML/HR: 5; 900 INJECTION, SOLUTION INTRAVENOUS at 13:02

## 2018-01-01 RX ADMIN — ONDANSETRON HYDROCHLORIDE 4 MG: 2 INJECTION, SOLUTION INTRAMUSCULAR; INTRAVENOUS at 12:06

## 2018-01-01 RX ADMIN — HYDROCORTISONE: 1 CREAM TOPICAL at 08:35

## 2018-01-01 RX ADMIN — LACTULOSE 30 G: 10 SOLUTION ORAL at 17:50

## 2018-01-01 RX ADMIN — CITALOPRAM HYDROBROMIDE 20 MG: 20 TABLET ORAL at 11:28

## 2018-01-01 RX ADMIN — PANTOPRAZOLE SODIUM 40 MG: 40 INJECTION, POWDER, FOR SOLUTION INTRAVENOUS at 05:47

## 2018-01-01 RX ADMIN — GABAPENTIN 800 MG: 400 CAPSULE ORAL at 22:33

## 2018-01-01 RX ADMIN — BUPRENORPHINE HYDROCHLORIDE AND NALOXONE HYDROCHLORIDE DIHYDRATE 1 TABLET: 8; 2 TABLET SUBLINGUAL at 17:15

## 2018-01-01 RX ADMIN — OXYCODONE HYDROCHLORIDE 5 MG: 5 TABLET ORAL at 14:34

## 2018-01-01 RX ADMIN — HYDROCORTISONE: 1 CREAM TOPICAL at 15:42

## 2018-01-01 RX ADMIN — LORAZEPAM 0.5 MG: 0.5 TABLET ORAL at 20:27

## 2018-01-01 RX ADMIN — GABAPENTIN 800 MG: 400 CAPSULE ORAL at 16:30

## 2018-01-01 RX ADMIN — POTASSIUM CHLORIDE 40 MEQ: 750 CAPSULE, EXTENDED RELEASE ORAL at 21:49

## 2018-01-01 RX ADMIN — PANTOPRAZOLE SODIUM 40 MG: 40 INJECTION, POWDER, FOR SOLUTION INTRAVENOUS at 05:59

## 2018-01-01 RX ADMIN — CITALOPRAM HYDROBROMIDE 20 MG: 20 TABLET ORAL at 08:35

## 2018-01-01 RX ADMIN — LORAZEPAM 0.5 MG: 0.5 TABLET ORAL at 08:33

## 2018-01-01 RX ADMIN — MEGESTROL ACETATE 200 MG: 40 SUSPENSION ORAL at 09:44

## 2018-01-01 RX ADMIN — DEXTROSE AND SODIUM CHLORIDE 30 ML/HR: 5; 450 INJECTION, SOLUTION INTRAVENOUS at 15:22

## 2018-01-01 RX ADMIN — PANTOPRAZOLE SODIUM 40 MG: 40 INJECTION, POWDER, FOR SOLUTION INTRAVENOUS at 06:31

## 2018-01-01 RX ADMIN — POTASSIUM CHLORIDE 40 MEQ: 750 CAPSULE, EXTENDED RELEASE ORAL at 03:04

## 2018-01-01 RX ADMIN — DEXTROSE AND SODIUM CHLORIDE 50 ML/HR: 5; 900 INJECTION, SOLUTION INTRAVENOUS at 01:08

## 2018-01-01 RX ADMIN — CITALOPRAM HYDROBROMIDE 20 MG: 20 TABLET ORAL at 08:20

## 2018-01-01 RX ADMIN — POLYVINYL ALCOHOL 2 DROP: 14 SOLUTION/ DROPS OPHTHALMIC at 22:00

## 2018-01-01 RX ADMIN — SODIUM CHLORIDE, PRESERVATIVE FREE 3 ML: 5 INJECTION INTRAVENOUS at 20:57

## 2018-01-01 RX ADMIN — MIRTAZAPINE 15 MG: 15 TABLET, FILM COATED ORAL at 20:26

## 2018-01-01 RX ADMIN — PANTOPRAZOLE SODIUM 40 MG: 40 INJECTION, POWDER, FOR SOLUTION INTRAVENOUS at 06:40

## 2018-01-01 RX ADMIN — HYDROCORTISONE: 1 CREAM TOPICAL at 20:14

## 2018-01-01 RX ADMIN — POTASSIUM CHLORIDE 40 MEQ: 750 CAPSULE, EXTENDED RELEASE ORAL at 16:14

## 2018-01-01 RX ADMIN — LEVOFLOXACIN 750 MG: 5 INJECTION, SOLUTION INTRAVENOUS at 03:03

## 2018-01-01 RX ADMIN — POTASSIUM CHLORIDE 40 MEQ: 750 CAPSULE, EXTENDED RELEASE ORAL at 01:25

## 2018-01-01 RX ADMIN — LACTULOSE 30 G: 10 SOLUTION ORAL at 09:41

## 2018-01-01 RX ADMIN — HYDROCODONE BITARTRATE AND ACETAMINOPHEN 1 TABLET: 5; 325 TABLET ORAL at 18:12

## 2018-01-01 RX ADMIN — LORAZEPAM 0.5 MG: 0.5 TABLET ORAL at 09:42

## 2018-01-01 RX ADMIN — MEGESTROL ACETATE 200 MG: 40 SUSPENSION ORAL at 17:14

## 2018-01-01 RX ADMIN — MIRTAZAPINE 15 MG: 15 TABLET, FILM COATED ORAL at 21:15

## 2018-01-01 RX ADMIN — POTASSIUM CHLORIDE 40 MEQ: 750 CAPSULE, EXTENDED RELEASE ORAL at 08:36

## 2018-01-01 RX ADMIN — NALOXONE HYDROCHLORIDE 0.2 MG: 0.4 INJECTION, SOLUTION INTRAMUSCULAR; INTRAVENOUS; SUBCUTANEOUS at 23:35

## 2018-01-01 RX ADMIN — PROPOFOL 20 MG: 10 INJECTION, EMULSION INTRAVENOUS at 15:52

## 2018-01-01 RX ADMIN — OXYCODONE HYDROCHLORIDE 5 MG: 5 TABLET ORAL at 18:37

## 2018-01-01 RX ADMIN — CITALOPRAM HYDROBROMIDE 20 MG: 20 TABLET ORAL at 08:38

## 2018-01-01 RX ADMIN — HYDROCORTISONE: 1 CREAM TOPICAL at 20:34

## 2018-01-01 RX ADMIN — GABAPENTIN 800 MG: 400 CAPSULE ORAL at 15:42

## 2018-01-01 RX ADMIN — GABAPENTIN 800 MG: 400 CAPSULE ORAL at 08:33

## 2018-01-01 RX ADMIN — MEGESTROL ACETATE 200 MG: 40 SUSPENSION ORAL at 12:13

## 2018-01-01 RX ADMIN — POLYVINYL ALCOHOL 2 DROP: 14 SOLUTION/ DROPS OPHTHALMIC at 00:58

## 2018-01-01 RX ADMIN — PROPOFOL 50 MG: 10 INJECTION, EMULSION INTRAVENOUS at 16:28

## 2018-01-01 RX ADMIN — HYDROCORTISONE: 1 CREAM TOPICAL at 21:11

## 2018-01-01 RX ADMIN — MORPHINE SULFATE 0.5 MG: 2 INJECTION, SOLUTION INTRAMUSCULAR; INTRAVENOUS at 13:02

## 2018-01-01 RX ADMIN — OXYCODONE HYDROCHLORIDE 5 MG: 5 TABLET ORAL at 22:09

## 2018-01-01 RX ADMIN — LORAZEPAM 1 MG: 1 TABLET ORAL at 17:15

## 2018-01-01 RX ADMIN — CARBOXYMETHYLCELLULOSE SODIUM 2 DROP: 5 SOLUTION/ DROPS OPHTHALMIC at 12:13

## 2018-01-01 RX ADMIN — FENTANYL CITRATE 25 MCG: 50 INJECTION, SOLUTION INTRAMUSCULAR; INTRAVENOUS at 09:52

## 2018-01-01 RX ADMIN — ONDANSETRON HYDROCHLORIDE 4 MG: 2 INJECTION, SOLUTION INTRAMUSCULAR; INTRAVENOUS at 19:41

## 2018-01-01 RX ADMIN — OXYCODONE HYDROCHLORIDE AND ACETAMINOPHEN 1 TABLET: 5; 325 TABLET ORAL at 13:32

## 2018-01-01 RX ADMIN — LORAZEPAM 0.5 MG: 0.5 TABLET ORAL at 21:29

## 2018-01-01 RX ADMIN — ONDANSETRON HYDROCHLORIDE 4 MG: 2 INJECTION, SOLUTION INTRAMUSCULAR; INTRAVENOUS at 13:33

## 2018-01-01 RX ADMIN — OXYCODONE HYDROCHLORIDE 5 MG: 5 TABLET ORAL at 01:50

## 2018-01-01 RX ADMIN — DEXTROSE AND SODIUM CHLORIDE 50 ML/HR: 5; 900 INJECTION, SOLUTION INTRAVENOUS at 22:33

## 2018-01-01 RX ADMIN — ONDANSETRON HYDROCHLORIDE 4 MG: 2 INJECTION, SOLUTION INTRAMUSCULAR; INTRAVENOUS at 06:13

## 2018-01-01 RX ADMIN — MIRTAZAPINE 15 MG: 15 TABLET, FILM COATED ORAL at 21:29

## 2018-01-01 RX ADMIN — NICOTINE 1 PATCH: 14 PATCH, EXTENDED RELEASE TRANSDERMAL at 12:44

## 2018-01-01 RX ADMIN — POLYVINYL ALCOHOL 2 DROP: 14 SOLUTION/ DROPS OPHTHALMIC at 13:10

## 2018-01-01 RX ADMIN — LEVOFLOXACIN 750 MG: 5 INJECTION, SOLUTION INTRAVENOUS at 00:57

## 2018-01-01 RX ADMIN — LEVOFLOXACIN 750 MG: 5 INJECTION, SOLUTION INTRAVENOUS at 01:09

## 2018-01-01 RX ADMIN — GABAPENTIN 800 MG: 400 CAPSULE ORAL at 21:29

## 2018-01-01 RX ADMIN — GABAPENTIN 800 MG: 400 CAPSULE ORAL at 20:33

## 2018-01-01 RX ADMIN — HYDROCODONE BITARTRATE AND ACETAMINOPHEN 1 TABLET: 5; 325 TABLET ORAL at 16:21

## 2018-01-01 RX ADMIN — HYDROCODONE BITARTRATE AND ACETAMINOPHEN 1 TABLET: 5; 325 TABLET ORAL at 01:24

## 2018-01-01 RX ADMIN — ONDANSETRON HYDROCHLORIDE 4 MG: 2 INJECTION, SOLUTION INTRAMUSCULAR; INTRAVENOUS at 10:17

## 2018-01-01 RX ADMIN — MIDAZOLAM HYDROCHLORIDE 1 MG: 2 INJECTION, SOLUTION INTRAMUSCULAR; INTRAVENOUS at 10:03

## 2018-01-01 RX ADMIN — LIDOCAINE HYDROCHLORIDE 50 MG: 10 INJECTION, SOLUTION INFILTRATION; PERINEURAL at 16:28

## 2018-01-01 RX ADMIN — SODIUM CHLORIDE, PRESERVATIVE FREE 3 ML: 5 INJECTION INTRAVENOUS at 08:59

## 2018-01-01 RX ADMIN — HYDROCORTISONE: 1 CREAM TOPICAL at 21:00

## 2018-01-01 RX ADMIN — DEXTROSE AND SODIUM CHLORIDE 50 ML/HR: 5; 900 INJECTION, SOLUTION INTRAVENOUS at 08:19

## 2018-01-01 RX ADMIN — LORAZEPAM 0.5 MG: 0.5 TABLET ORAL at 20:33

## 2018-01-01 RX ADMIN — OXYCODONE HYDROCHLORIDE 5 MG: 5 TABLET ORAL at 17:29

## 2018-01-01 RX ADMIN — OXYCODONE HYDROCHLORIDE 5 MG: 5 TABLET ORAL at 10:30

## 2018-01-01 RX ADMIN — OXYCODONE HYDROCHLORIDE 5 MG: 5 TABLET ORAL at 21:39

## 2018-01-01 RX ADMIN — MIRTAZAPINE 15 MG: 15 TABLET, FILM COATED ORAL at 20:14

## 2018-01-01 RX ADMIN — HYDROCODONE BITARTRATE AND ACETAMINOPHEN 1 TABLET: 5; 325 TABLET ORAL at 08:46

## 2018-01-01 RX ADMIN — DEXTROSE AND SODIUM CHLORIDE 50 ML/HR: 5; 900 INJECTION, SOLUTION INTRAVENOUS at 05:59

## 2018-01-01 RX ADMIN — PANTOPRAZOLE SODIUM 40 MG: 40 INJECTION, POWDER, FOR SOLUTION INTRAVENOUS at 09:02

## 2018-01-01 RX ADMIN — LORAZEPAM 0.5 MG: 0.5 TABLET ORAL at 08:19

## 2018-01-01 RX ADMIN — LORAZEPAM 0.5 MG: 0.5 TABLET ORAL at 08:38

## 2018-01-01 RX ADMIN — HYDROCODONE BITARTRATE AND ACETAMINOPHEN 1 TABLET: 5; 325 TABLET ORAL at 00:35

## 2018-01-01 RX ADMIN — OXYCODONE HYDROCHLORIDE 5 MG: 5 TABLET ORAL at 09:21

## 2018-01-01 RX ADMIN — PANTOPRAZOLE SODIUM 40 MG: 40 INJECTION, POWDER, FOR SOLUTION INTRAVENOUS at 05:28

## 2018-01-01 RX ADMIN — LORAZEPAM 0.5 MG: 0.5 TABLET ORAL at 17:14

## 2018-01-01 RX ADMIN — CITALOPRAM HYDROBROMIDE 20 MG: 20 TABLET ORAL at 09:42

## 2018-01-01 RX ADMIN — DEXTROSE AND SODIUM CHLORIDE 50 ML/HR: 5; 900 INJECTION, SOLUTION INTRAVENOUS at 03:52

## 2018-01-01 RX ADMIN — SODIUM CHLORIDE, PRESERVATIVE FREE 3 ML: 5 INJECTION INTRAVENOUS at 21:12

## 2018-01-01 RX ADMIN — CARBOXYMETHYLCELLULOSE SODIUM 2 DROP: 5 SOLUTION/ DROPS OPHTHALMIC at 17:15

## 2018-01-01 RX ADMIN — MIDAZOLAM HYDROCHLORIDE 1 MG: 2 INJECTION, SOLUTION INTRAMUSCULAR; INTRAVENOUS at 09:51

## 2018-01-01 RX ADMIN — OXYCODONE HYDROCHLORIDE 5 MG: 5 TABLET ORAL at 02:10

## 2018-01-01 RX ADMIN — PANTOPRAZOLE SODIUM 40 MG: 40 INJECTION, POWDER, FOR SOLUTION INTRAVENOUS at 06:14

## 2018-01-01 RX ADMIN — LACTULOSE 30 G: 10 SOLUTION ORAL at 08:32

## 2018-01-01 RX ADMIN — LEVOFLOXACIN 750 MG: 5 INJECTION, SOLUTION INTRAVENOUS at 02:20

## 2018-01-01 RX ADMIN — TRAZODONE HYDROCHLORIDE 50 MG: 50 TABLET ORAL at 02:18

## 2018-01-01 RX ADMIN — CITALOPRAM HYDROBROMIDE 20 MG: 20 TABLET ORAL at 08:32

## 2018-01-01 RX ADMIN — LEVOFLOXACIN 750 MG: 5 INJECTION, SOLUTION INTRAVENOUS at 02:52

## 2018-01-01 RX ADMIN — NICOTINE 1 PATCH: 14 PATCH, EXTENDED RELEASE TRANSDERMAL at 08:33

## 2018-01-01 RX ADMIN — GABAPENTIN 800 MG: 400 CAPSULE ORAL at 20:27

## 2018-01-01 RX ADMIN — CITALOPRAM HYDROBROMIDE 20 MG: 20 TABLET ORAL at 17:14

## 2018-01-01 RX ADMIN — GABAPENTIN 800 MG: 400 CAPSULE ORAL at 17:14

## 2018-01-01 RX ADMIN — PROPOFOL 20 MG: 10 INJECTION, EMULSION INTRAVENOUS at 16:32

## 2018-01-01 RX ADMIN — MIRTAZAPINE 15 MG: 15 TABLET, FILM COATED ORAL at 22:33

## 2018-01-01 RX ADMIN — CARBOXYMETHYLCELLULOSE SODIUM 2 DROP: 5 SOLUTION/ DROPS OPHTHALMIC at 18:24

## 2018-01-01 RX ADMIN — CARBOXYMETHYLCELLULOSE SODIUM 2 DROP: 5 SOLUTION/ DROPS OPHTHALMIC at 09:01

## 2018-01-01 RX ADMIN — CARBOXYMETHYLCELLULOSE SODIUM 2 DROP: 5 SOLUTION/ DROPS OPHTHALMIC at 08:35

## 2018-01-01 RX ADMIN — POLYVINYL ALCOHOL 2 DROP: 14 SOLUTION/ DROPS OPHTHALMIC at 01:15

## 2018-01-01 RX ADMIN — OXYCODONE HYDROCHLORIDE 5 MG: 5 TABLET ORAL at 06:13

## 2018-01-01 RX ADMIN — PANTOPRAZOLE SODIUM 40 MG: 40 INJECTION, POWDER, FOR SOLUTION INTRAVENOUS at 06:36

## 2018-01-01 RX ADMIN — POTASSIUM CHLORIDE 10 MEQ: 7.46 INJECTION, SOLUTION INTRAVENOUS at 06:36

## 2018-01-01 RX ADMIN — HYDROCORTISONE: 1 CREAM TOPICAL at 21:29

## 2018-01-01 RX ADMIN — LORAZEPAM 0.5 MG: 0.5 TABLET ORAL at 15:41

## 2018-01-01 RX ADMIN — LACTULOSE 30 G: 10 SOLUTION ORAL at 17:13

## 2018-01-01 RX ADMIN — HYDROCORTISONE: 1 CREAM TOPICAL at 08:16

## 2018-01-01 RX ADMIN — OXYCODONE HYDROCHLORIDE 5 MG: 5 TABLET ORAL at 15:42

## 2018-01-01 RX ADMIN — HYDROCODONE BITARTRATE AND ACETAMINOPHEN 1 TABLET: 5; 325 TABLET ORAL at 22:39

## 2018-01-01 RX ADMIN — LORAZEPAM 0.5 MG: 0.5 TABLET ORAL at 20:14

## 2018-01-01 RX ADMIN — HYDROCODONE BITARTRATE AND ACETAMINOPHEN 1 TABLET: 5; 325 TABLET ORAL at 13:10

## 2018-01-01 RX ADMIN — HYDROCORTISONE: 1 CREAM TOPICAL at 16:24

## 2018-01-01 RX ADMIN — SODIUM CHLORIDE, PRESERVATIVE FREE 3 ML: 5 INJECTION INTRAVENOUS at 02:10

## 2018-01-01 RX ADMIN — POTASSIUM CHLORIDE 10 MEQ: 7.46 INJECTION, SOLUTION INTRAVENOUS at 16:09

## 2018-01-01 RX ADMIN — PANTOPRAZOLE SODIUM 40 MG: 40 INJECTION, POWDER, FOR SOLUTION INTRAVENOUS at 06:13

## 2018-01-01 RX ADMIN — PANTOPRAZOLE SODIUM 40 MG: 40 INJECTION, POWDER, FOR SOLUTION INTRAVENOUS at 07:12

## 2018-01-01 RX ADMIN — GABAPENTIN 800 MG: 400 CAPSULE ORAL at 20:14

## 2018-01-01 RX ADMIN — MIRTAZAPINE 15 MG: 15 TABLET, FILM COATED ORAL at 20:57

## 2018-01-01 RX ADMIN — CARBOXYMETHYLCELLULOSE SODIUM 2 DROP: 5 SOLUTION/ DROPS OPHTHALMIC at 21:12

## 2018-01-01 RX ADMIN — DEXTROSE AND SODIUM CHLORIDE 50 ML/HR: 5; 900 INJECTION, SOLUTION INTRAVENOUS at 01:10

## 2018-01-01 RX ADMIN — SODIUM CHLORIDE, PRESERVATIVE FREE 3 ML: 5 INJECTION INTRAVENOUS at 01:15

## 2018-01-01 RX ADMIN — HYDROCODONE BITARTRATE AND ACETAMINOPHEN 1 TABLET: 5; 325 TABLET ORAL at 19:02

## 2018-01-01 RX ADMIN — GABAPENTIN 800 MG: 400 CAPSULE ORAL at 15:41

## 2018-01-01 RX ADMIN — LORAZEPAM 0.5 MG: 0.5 TABLET ORAL at 22:33

## 2018-01-01 RX ADMIN — LACTULOSE 30 G: 10 SOLUTION ORAL at 08:33

## 2018-01-01 RX ADMIN — GABAPENTIN 800 MG: 400 CAPSULE ORAL at 08:19

## 2018-01-01 RX ADMIN — HYDROCORTISONE: 1 CREAM TOPICAL at 17:13

## 2018-01-01 RX ADMIN — ALBUMIN HUMAN 12.5 G: 0.25 SOLUTION INTRAVENOUS at 02:24

## 2018-01-01 RX ADMIN — HYDROCORTISONE: 1 CREAM TOPICAL at 08:34

## 2018-01-01 RX ADMIN — CITALOPRAM HYDROBROMIDE 20 MG: 20 TABLET ORAL at 08:33

## 2018-01-01 RX ADMIN — NICOTINE 1 PATCH: 14 PATCH, EXTENDED RELEASE TRANSDERMAL at 08:38

## 2018-01-01 RX ADMIN — SODIUM CHLORIDE, PRESERVATIVE FREE 3 ML: 5 INJECTION INTRAVENOUS at 08:35

## 2018-01-01 RX ADMIN — HYDROCORTISONE: 1 CREAM TOPICAL at 09:41

## 2018-01-01 RX ADMIN — GABAPENTIN 800 MG: 400 CAPSULE ORAL at 08:37

## 2018-01-01 RX ADMIN — HYDROCODONE BITARTRATE AND ACETAMINOPHEN 1 TABLET: 5; 325 TABLET ORAL at 13:41

## 2018-01-01 RX ADMIN — DICYCLOMINE HYDROCHLORIDE 10 MG: 10 CAPSULE ORAL at 02:18

## 2018-01-10 NOTE — TELEPHONE ENCOUNTER
----- Message from Mel Pelletier sent at 1/9/2018  8:54 AM CST -----  Pt is running a fever and cannot come in , please call regarding medicine. Would like results over the phone

## 2018-01-10 NOTE — TELEPHONE ENCOUNTER
01/10/2018, 1000 - Patient, Guillermina Noble, 1960, telephoned per this staff member [phone].  Zero answer.  Voice message submitted with date, time, office contact information, inquiry if patient remains febrile, and instruction to contact office at patient's earliest convenience to schedule clinical appointment for Thursday, January 11, 2018 or Tuesday, January 16, 2018.    01/10/2018, 1003 - Patient, Guillermina Noble, 1960, daughter, Guero Dean, returned this staff member's telephone call.  Daughter reports patient, Guillermina Noble, 1960, afebrile.  Patient clinical appointment scheduled with Dr. Jorge Arreaga M.D. Thursday, January 11, 2018 at 10:15 a.m.  Understanding verbalized.

## 2018-01-10 NOTE — TELEPHONE ENCOUNTER
01/09/2018, 1222 - Patient, Guillermina Noble, 1960, telephoned per this staff member [phone].  Patient requested this staff member speak with daughter, Guero Dean.  Patient requested CT Abdomen Pelvis Without Contrast results performed 12/05/2017 provided as stated in the Impression section of procedure report - No CT evidence of Acute Abdominal or Pelvic finding; Hepatic and Splenic enlargement; Status post Cholecystectomy and Hysterectomy.  Patient clinical appointment with Dr. Jorge Arreaga M.D. rescheduled with first available clinical appointment - Tuesday, February 20, 2018 at 9:30 a.m.  Patient requested earlier clinical appointment date/time.  Patient instructed to call office Wednesday, January 10, 2018 to inquire if there have been any cancellations.  Understanding verbalized.      Note - Patient stated she did not have questions/comments regarding prescription medication.

## 2018-01-11 PROBLEM — R10.9 ABDOMINAL PAIN: Status: ACTIVE | Noted: 2018-01-01

## 2018-01-11 NOTE — PROGRESS NOTES
[facility]      Chief Complaint:   Chief Complaint   Patient presents with   • Laboratory Values Performed 12/07/2017   • CT Abdomen Pelvis Without Contrast Performed 12/05/2017       Subjective     HPI:   Patient with history of hepatitis C.  Patient was treated with epclusa at this time her hepatitis C panel is negative.  Patient will need repeat lab work at 3 months at that point we'll be a cure.  Patient continues to have abdominal pain.  CT scan only shows mild enlargement of the liver and spleen.  This most likely secondary to patient's cirrhosis.  Patient continues to smoke and has a significantly decreased pulse oximetry at 93.  We will do an ultrasound of the abdomen with Dopplers to see if patient may be having mesenteric ischemia causing her abdominal pain.    Plan 7: We'll repeat labwork ultrasound the abdomen to follow up in 3 months.    Past Medical History:   Past Medical History:   Diagnosis Date   • Asthma    • COPD (chronic obstructive pulmonary disease)    • Coronary artery disease    • Emphysema of lung    • Emphysema/COPD    • Multiple allergies        Family History:  Family History   Problem Relation Age of Onset   • Lung disease Mother    • Lung cancer Father    • Diabetes Sister    • Cirrhosis Brother    • Liver disease Brother    • Heart disease Maternal Grandmother    • Heart disease Maternal Grandfather    • Heart disease Paternal Grandmother    • Heart disease Paternal Grandfather        Social History:   reports that she has been smoking Cigarettes.  She has a 48.00 pack-year smoking history. She has never used smokeless tobacco. She reports that she does not drink alcohol or use illicit drugs.    Medications:   Prior to Admission medications    Medication Sig Start Date End Date Taking? Authorizing Provider   dicyclomine (BENTYL) 10 MG capsule TAKE 1 CAPSULE BY MOUTH THREE TIMES DAILY 4/6/17  Yes Historical Provider, MD   esomeprazole (nexIUM) 40 MG capsule  Take 40 mg by mouth Every Morning Before Breakfast.   Yes Historical Provider, MD   promethazine (PHENERGAN) 25 MG tablet Take 1 tablet by mouth Every 6 (Six) Hours As Needed for Nausea or Vomiting. 12/7/17  Yes Jorge Lugo MD   SUBOXONE 8-2 MG film film Take 8.2 mg by mouth Daily. 9/29/17  Yes Historical Provider, MD   tiZANidine (ZANAFLEX) 4 MG tablet TAKE 1 TABLET BY MOUTH TWICE DAILY AS NEEDED 3/11/17  Yes Historical Provider, MD   citalopram (CeleXA) 20 MG tablet TAKE 1 TABLET BY MOUTH DAILY 12/11/17   Historical Provider, MD   furosemide (LASIX) 20 MG tablet TAKE 1 TABLET BY MOUTH DAILY 11/29/17   Historical Provider, MD   gabapentin (NEURONTIN) 600 MG tablet TAKE 1 TABLET BY MOUTH THREE TIMES DAILY 12/7/17   Historical Provider, MD   ondansetron ODT (ZOFRAN-ODT) 4 MG disintegrating tablet LET 1 TABLET DISSOLVE ON THE TONGUE EVERY 8 HOURS AS NEEDED FOR NAUSEA FOR 15 DAYS 12/6/17   Historical Provider, MD   promethazine (PHENERGAN) 25 MG tablet TAKE 1/2 - 1 TABLET BY MOUTH TWICE DAILY AS NEEDED FOR NAUSEA 4/6/17 1/11/18  Historical Provider, MD   Sofosbuvir-Velpatasvir 400-100 MG tablet Take 1 tablet by mouth Daily. 9/5/17 1/11/18  Jorge Lugo MD   sucralfate (CARAFATE) 1 GM/10ML suspension Take 10 mL by mouth 4 (Four) Times a Day. 7/11/17 1/11/18  Jorge Lugo MD       Allergies:  Sudafed [pseudoephedrine hcl]; Codeine; Iodine; and Keflex [cephalexin]    ROS:    Review of Systems   Constitutional: Negative for activity change, appetite change, chills, diaphoresis, fatigue, fever and unexpected weight change.   HENT: Negative for sore throat and trouble swallowing.    Respiratory: Negative for shortness of breath.    Gastrointestinal: Positive for abdominal pain. Negative for abdominal distention, anal bleeding, blood in stool, constipation, diarrhea, nausea, rectal pain and vomiting.   Musculoskeletal: Negative for arthralgias.   Skin: Negative for pallor.   Neurological: Negative for  "light-headedness.     Objective     Blood pressure 104/74, pulse 102, height 167.6 cm (66\"), weight 58.2 kg (128 lb 6.4 oz), SpO2 93 %.    Physical Exam   Constitutional: She is oriented to person, place, and time. She appears well-developed and well-nourished. No distress.   HENT:   Head: Normocephalic and atraumatic.   Cardiovascular: Normal rate, regular rhythm, normal heart sounds and intact distal pulses.  Exam reveals no gallop and no friction rub.    No murmur heard.  Pulmonary/Chest: Breath sounds normal. No respiratory distress. She has no wheezes. She has no rales. She exhibits no tenderness.   Abdominal: Soft. Bowel sounds are normal. She exhibits no distension and no mass. There is no tenderness. There is no rebound and no guarding. No hernia.   Musculoskeletal: Normal range of motion. She exhibits no edema.   Neurological: She is alert and oriented to person, place, and time.   Skin: Skin is warm and dry. No rash noted. She is not diaphoretic. No erythema. No pallor.   Psychiatric: She has a normal mood and affect. Her behavior is normal. Judgment and thought content normal.        Assessment/Plan   Guillermina was seen today for laboratory values performed 12/07/2017 and ct abdomen pelvis without contrast performed 12/05/2017.    Diagnoses and all orders for this visit:    Abdominal pain, unspecified abdominal location  -     US Abdominal Vascular Complete; Future  -     Comprehensive Metabolic Panel  -     CBC & Differential  -     Hepatitis C RNA, Quantitative, PCR (graph)        * Surgery not found *     Diagnosis Plan   1. Abdominal pain, unspecified abdominal location  US Abdominal Vascular Complete    Comprehensive Metabolic Panel    CBC & Differential    Hepatitis C RNA, Quantitative, PCR (graph)       Anticipated Surgical Procedure:  Orders Placed This Encounter   Procedures   • US Abdominal Vascular Complete     Standing Status:   Future     Standing Expiration Date:   1/11/2019     Order Specific " Question:   Reason for Exam:     Answer:   abd pain   • Comprehensive Metabolic Panel   • Hepatitis C RNA, Quantitative, PCR (graph)   • CBC & Differential     Order Specific Question:   Manual Differential     Answer:   No       The risks, benefits, and alternatives of this procedure have been discussed with the patient or the responsible party- the patient understands and agrees to proceed.

## 2018-01-15 NOTE — TELEPHONE ENCOUNTER
Office tried contacting patient to let her know that her ultrasound abd of messenteric vessels is at [facility] for Jan 23,2018 at 9 am. Patient needs to be NPO after midnight. Left message for patient to return phone call.

## 2018-02-20 NOTE — PROGRESS NOTES
[facility]      Chief Complaint:   Chief Complaint   Patient presents with   • Chronic Hepatitis C Without Hepatic Coma     Ultrasound Completed 01/30/2018.       Subjective     HPI:   Patient with hepatitis C.  Patient also with abdominal pain and weight loss.  Patient has managed to gain a few pounds since last visit.  Patient does continue to have some abdominal pain.  Patient had lab work for hepatitis C this morning    Plan; we'll have patient follow-up in 3 weeks to go for lab work.  Discussed the patient she needs to reduce her medications that are making her tired including the Suboxone.    Past Medical History:   Past Medical History:   Diagnosis Date   • Asthma    • COPD (chronic obstructive pulmonary disease)    • Coronary artery disease    • Emphysema of lung    • Emphysema/COPD    • Multiple allergies        Family History:  Family History   Problem Relation Age of Onset   • Lung disease Mother    • Lung cancer Father    • Diabetes Sister    • Cirrhosis Brother    • Liver disease Brother    • Heart disease Maternal Grandmother    • Heart disease Maternal Grandfather    • Heart disease Paternal Grandmother    • Heart disease Paternal Grandfather        Social History:   reports that she has been smoking Cigarettes.  She has a 48.00 pack-year smoking history. She has never used smokeless tobacco. She reports that she does not drink alcohol or use illicit drugs.    Medications:   Prior to Admission medications    Medication Sig Start Date End Date Taking? Authorizing Provider   citalopram (CeleXA) 20 MG tablet TAKE 1 TABLET BY MOUTH DAILY 12/11/17  Yes Historical Provider, MD   dicyclomine (BENTYL) 10 MG capsule TAKE 1 CAPSULE BY MOUTH THREE TIMES DAILY 4/6/17  Yes Historical Provider, MD   esomeprazole (nexIUM) 40 MG capsule Take 40 mg by mouth Every Morning Before Breakfast.   Yes Historical Provider, MD   furosemide (LASIX) 20 MG tablet TAKE 1 TABLET BY MOUTH DAILY 11/29/17  Yes  "Historical Provider, MD   gabapentin (NEURONTIN) 800 MG tablet Take 800 mg by mouth 3 (Three) Times a Day.   Yes Historical Provider, MD   ondansetron ODT (ZOFRAN-ODT) 4 MG disintegrating tablet LET 1 TABLET DISSOLVE ON THE TONGUE EVERY 8 HOURS AS NEEDED FOR NAUSEA FOR 15 DAYS 12/6/17  Yes Historical Provider, MD   promethazine (PHENERGAN) 25 MG tablet Take 1 tablet by mouth Every 6 (Six) Hours As Needed for Nausea or Vomiting. 12/7/17  Yes Jorge Lugo MD   SUBOXONE 8-2 MG film film Take 8.2 mg by mouth 3 (Three) Times a Day. 9/29/17  Yes Historical Provider, MD   tiZANidine (ZANAFLEX) 4 MG tablet TAKE 1 TABLET BY MOUTH TWICE DAILY AS NEEDED 3/11/17  Yes Historical Provider, MD   clonazePAM (KlonoPIN) 0.5 MG tablet  2/10/18   Historical Provider, MD   gabapentin (NEURONTIN) 600 MG tablet TAKE 1 TABLET BY MOUTH THREE TIMES DAILY 12/7/17 2/20/18  Historical Provider, MD       Allergies:  Codeine; Iodine; Keflex [cephalexin]; and Sudafed [pseudoephedrine hcl]    ROS:    Review of Systems   Constitutional: Negative for activity change, appetite change, chills, diaphoresis, fatigue, fever and unexpected weight change.   HENT: Negative for sore throat and trouble swallowing.    Respiratory: Negative for shortness of breath.    Gastrointestinal: Negative for abdominal distention, abdominal pain, anal bleeding, blood in stool, constipation, diarrhea, nausea, rectal pain and vomiting.   Musculoskeletal: Negative for arthralgias.   Skin: Negative for pallor.   Neurological: Negative for light-headedness.     Objective     Blood pressure 100/84, pulse 118, height 167.6 cm (66\"), weight 59.2 kg (130 lb 9.6 oz), SpO2 94 %.    Physical Exam   Constitutional: She is oriented to person, place, and time. She appears well-developed and well-nourished. No distress.   HENT:   Head: Normocephalic and atraumatic.   Cardiovascular: Normal rate, regular rhythm, normal heart sounds and intact distal pulses.  Exam reveals no gallop and " no friction rub.    No murmur heard.  Pulmonary/Chest: Breath sounds normal. No respiratory distress. She has no wheezes. She has no rales. She exhibits no tenderness.   Abdominal: Soft. Bowel sounds are normal. She exhibits no distension and no mass. There is no tenderness. There is no rebound and no guarding. No hernia.   Musculoskeletal: Normal range of motion. She exhibits no edema.   Neurological: She is alert and oriented to person, place, and time.   Skin: Skin is warm and dry. No rash noted. She is not diaphoretic. No erythema. No pallor.   Psychiatric: She has a normal mood and affect. Her behavior is normal. Judgment and thought content normal.        Assessment/Plan   Guillermina was seen today for chronic hepatitis c without hepatic coma.    Diagnoses and all orders for this visit:    Chronic hepatitis C without hepatic coma        * Surgery not found *     Diagnosis Plan   1. Chronic hepatitis C without hepatic coma         Anticipated Surgical Procedure:  No orders of the defined types were placed in this encounter.      The risks, benefits, and alternatives of this procedure have been discussed with the patient or the responsible party- the patient understands and agrees to proceed.

## 2018-03-13 NOTE — PROGRESS NOTES
[facility]      Chief Complaint:   Chief Complaint   Patient presents with   • Chronic Hepatitis C Without Hepatic Coma       Subjective     HPI:   Patient with chronic hepatitis C.  Patient had this treated and is cured from hepatitis C.  Patient is having right upper quadrant abdominal pain.  On last lab work patient mildly elevated alpha-fetoprotein.    Plan; we'll reorder labs including L fetoprotein CMP and CBC.  While patient follow up in 4 weeks.    Past Medical History:   Past Medical History:   Diagnosis Date   • Asthma    • COPD (chronic obstructive pulmonary disease)    • Coronary artery disease    • Emphysema of lung    • Emphysema/COPD    • Multiple allergies        Family History:  Family History   Problem Relation Age of Onset   • Lung disease Mother    • Lung cancer Father    • Diabetes Sister    • Cirrhosis Brother    • Liver disease Brother    • Heart disease Maternal Grandmother    • Heart disease Maternal Grandfather    • Heart disease Paternal Grandmother    • Heart disease Paternal Grandfather        Social History:   reports that she has been smoking Cigarettes.  She has a 48.00 pack-year smoking history. She has never used smokeless tobacco. She reports that she does not drink alcohol or use drugs.    Medications:   Prior to Admission medications    Medication Sig Start Date End Date Taking? Authorizing Provider   citalopram (CeleXA) 20 MG tablet TAKE 1 TABLET BY MOUTH DAILY 12/11/17  Yes Historical Provider, MD   dicyclomine (BENTYL) 10 MG capsule TAKE 1 CAPSULE BY MOUTH THREE TIMES DAILY 4/6/17  Yes Historical Provider, MD   esomeprazole (nexIUM) 40 MG capsule Take 40 mg by mouth Every Morning Before Breakfast.   Yes Historical Provider, MD   furosemide (LASIX) 20 MG tablet TAKE 1 TABLET BY MOUTH DAILY 11/29/17  Yes Historical Provider, MD   gabapentin (NEURONTIN) 800 MG tablet Take 800 mg by mouth 3 (Three) Times a Day.   Yes Historical Provider, MD   ondansetron  "ODT (ZOFRAN-ODT) 4 MG disintegrating tablet LET 1 TABLET DISSOLVE ON THE TONGUE EVERY 8 HOURS AS NEEDED FOR NAUSEA FOR 15 DAYS 12/6/17  Yes Historical Provider, MD   promethazine (PHENERGAN) 25 MG tablet Take 1 tablet by mouth Every 6 (Six) Hours As Needed for Nausea or Vomiting. 12/7/17  Yes Jorge Lugo MD   SUBOXONE 8-2 MG film film Take 8.2 mg by mouth 3 (Three) Times a Day. 9/29/17  Yes Historical Provider, MD   tiZANidine (ZANAFLEX) 4 MG tablet TAKE 1 TABLET BY MOUTH TWICE DAILY AS NEEDED 3/11/17  Yes Historical Provider, MD   clonazePAM (KlonoPIN) 1 MG tablet TAKE 2 TO 3 TABLETS BY MOUTH EVERY DAY AS NEEDED 3/8/18   Historical Provider, MD   traZODone (DESYREL) 50 MG tablet  3/12/18   Historical Provider, MD   clonazePAM (KlonoPIN) 0.5 MG tablet  2/10/18 3/13/18  Historical Provider, MD       Allergies:  Codeine; Iodine; Keflex [cephalexin]; and Sudafed [pseudoephedrine hcl]    ROS:    Review of Systems   Constitutional: Negative for activity change, appetite change, chills, diaphoresis, fatigue, fever and unexpected weight change.   HENT: Negative for sore throat and trouble swallowing.    Respiratory: Negative for shortness of breath.    Gastrointestinal: Positive for abdominal pain. Negative for abdominal distention, anal bleeding, blood in stool, constipation, diarrhea, nausea, rectal pain and vomiting.   Musculoskeletal: Negative for arthralgias.   Skin: Negative for pallor.   Neurological: Negative for light-headedness.     Objective     Blood pressure 100/60, pulse 88, height 167.6 cm (66\"), weight 59.2 kg (130 lb 9.6 oz), SpO2 96 %.    Physical Exam   Constitutional: She is oriented to person, place, and time. She appears well-developed and well-nourished. No distress.   HENT:   Head: Normocephalic and atraumatic.   Cardiovascular: Normal rate, regular rhythm, normal heart sounds and intact distal pulses.  Exam reveals no gallop and no friction rub.    No murmur heard.  Pulmonary/Chest: Breath " sounds normal. No respiratory distress. She has no wheezes. She has no rales. She exhibits no tenderness.   Abdominal: Soft. Bowel sounds are normal. She exhibits no distension and no mass. There is tenderness. There is no rebound and no guarding. No hernia.   Musculoskeletal: Normal range of motion. She exhibits no edema.   Neurological: She is alert and oriented to person, place, and time.   Skin: Skin is warm and dry. No rash noted. She is not diaphoretic. No erythema. No pallor.   Psychiatric: She has a normal mood and affect. Her behavior is normal. Judgment and thought content normal.        Assessment/Plan   Guillermina was seen today for chronic hepatitis c without hepatic coma.    Diagnoses and all orders for this visit:    Cirrhosis of liver without ascites, unspecified hepatic cirrhosis type  -     AFP Tumor Marker  -     Comprehensive Metabolic Panel  -     CBC & Differential        * Surgery not found *     Diagnosis Plan   1. Cirrhosis of liver without ascites, unspecified hepatic cirrhosis type  AFP Tumor Marker    Comprehensive Metabolic Panel    CBC & Differential       Anticipated Surgical Procedure:  Orders Placed This Encounter   Procedures   • AFP Tumor Marker   • Comprehensive Metabolic Panel   • CBC & Differential     Order Specific Question:   Manual Differential     Answer:   No       The risks, benefits, and alternatives of this procedure have been discussed with the patient or the responsible party- the patient understands and agrees to proceed.

## 2018-03-14 PROBLEM — K76.9 LIVER DISEASE: Status: ACTIVE | Noted: 2018-01-01

## 2018-03-16 NOTE — TELEPHONE ENCOUNTER
03/15/2018, 1649 - Patient telephoned this staff member submitting voice message requesting this staff member to return telephone call as patient has questions regarding laboratory results performed 02/20/2018.      03/16/2018, 1151 - Patient's telephone call returned per this staff member [phone].  Zero answer.  Voice message submitted with date, time, office contact information, and instruction to return this staff member's telephone call at patient's earliest convenience.

## 2018-03-16 NOTE — PROGRESS NOTES
"03/16/2018, 1202 - Individual identifying herself as patient's daughter, Guero Dean, returned this staff member's telephone call stating mother inquired as to what is the meaning of \"mildly\" elevated Alpha-Fetoprotein laboratory levels as stated per Dr. Jorge Arreaga M.D. during clinical appointment 03/13/2018.  Daughter informed Alpha-Fetoprotein laboratory value performed 02/20/2018 12.8 with reference range of 0.0 - 8.3.  Daughter made aware Dr. Arreaga wishes for patient to repeat laboratory value prior to clinical appointment scheduled Tuesday, April 17, 2018 at 2:30 p.m. in order to monitor laboratory value.  Daughter inquired if patient/mother has cancer.  Clinical documentation dated 03/13/2018 as recorded per Dr. Arreaga referenced with zero notation of cancer.  Daughter verbalized understanding.  "

## 2018-04-12 NOTE — TELEPHONE ENCOUNTER
04/12/2018, 1551 - Patient telephoned per this staff member [phone] with notification laboratory testing (Comprehensive Metabolic Panel and Complete Blood Count With Differential/Plates) as completed on 04/02/2018 per [facility] had been reviewed per Dr. Jorge Arreaga M.D.  Per Dr. Arreaga, laboratory values acceptable.  Patient verbalized understanding and given reminder of clinical appointment with Dr. Arreaga Tuesday, April 17, 2018 at 2:30 p.m.

## 2018-09-28 NOTE — TELEPHONE ENCOUNTER
09/28/2018, 1044 - Patient prior clinical appointment with Dr. Jorge Arreaga M.D. 03/13/2018 with next scheduled clinical appointment Tuesday, October 2, 2018 at 9:15 a.m.  Verbal approval received per Dr. Jorge Arreaga M.D. - Phenergan 25 MG Tablets, 1 tablet by mouth every 6 hours as needed for nausea and vomiting, #20, 0 renewals.

## 2018-10-02 PROBLEM — R93.2 ABNORMAL FINDINGS ON DIAGNOSTIC IMAGING OF LIVER AND BILIARY TRACT: Status: ACTIVE | Noted: 2018-01-01
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
## 2018-10-02 NOTE — PROGRESS NOTES
[facility]      Chief Complaint:   Chief Complaint   Patient presents with   • Cirrhosis   • Follow-up     lab results       Subjective     HPI:   Agent with cirrhosis of the liver.  Patient has right upper quadrant abdominal pain severe nature.  Patient has recently had a fall out of her bed and claims swelling in the right upper quadrant.  Patient does have tenderness over the right rib.  Patient had hepatitis C but was treated for this and last labs show negative for hepatitis C.    Plan; we'll do CT scan of the abdomen will also order lab work including CMP CBC alpha-fetoprotein and hepatitis C by PCR.  Patient did have an mildly elevated alpha fetoprotein on lab work 3 months ago.  Patient has not had this repeated    Past Medical History:   Past Medical History:   Diagnosis Date   • Asthma    • COPD (chronic obstructive pulmonary disease) (CMS/HCC)    • Coronary artery disease    • Emphysema of lung (CMS/HCC)    • Emphysema/COPD (CMS/HCC)    • Multiple allergies        Family History:  Family History   Problem Relation Age of Onset   • Lung disease Mother    • Lung cancer Father    • Diabetes Sister    • Cirrhosis Brother    • Liver disease Brother    • Heart disease Maternal Grandmother    • Heart disease Maternal Grandfather    • Heart disease Paternal Grandmother    • Heart disease Paternal Grandfather        Social History:   reports that she has been smoking Cigarettes.  She has a 48.00 pack-year smoking history. She has never used smokeless tobacco. She reports that she does not drink alcohol or use drugs.    Medications:   Prior to Admission medications    Medication Sig Start Date End Date Taking? Authorizing Provider   citalopram (CeleXA) 20 MG tablet TAKE 1 TABLET BY MOUTH DAILY 12/11/17  Yes ProviderRoxanna MD   clonazePAM (KlonoPIN) 1 MG tablet TAKE 2 TO 3 TABLETS BY MOUTH EVERY DAY AS NEEDED 3/8/18  Yes ProviderRoxanna MD   dicyclomine (BENTYL) 10 MG capsule  Take 1 capsule by mouth 3 (Three) Times a Day. 10/2/18  Yes Jorge Lugo MD   esomeprazole (nexIUM) 40 MG capsule Take 1 capsule by mouth Every Morning Before Breakfast. 10/2/18  Yes Jorge Lugo MD   furosemide (LASIX) 20 MG tablet TAKE 1 TABLET BY MOUTH DAILY 11/29/17  Yes Roxanna Barnes MD   gabapentin (NEURONTIN) 800 MG tablet Take 800 mg by mouth 3 (Three) Times a Day.   Yes Roxanna Barnes MD   ondansetron ODT (ZOFRAN-ODT) 4 MG disintegrating tablet LET 1 TABLET DISSOLVE ON THE TONGUE EVERY 8 HOURS AS NEEDED FOR NAUSEA FOR 15 DAYS 12/6/17  Yes Roxanna Barnes MD   promethazine (PHENERGAN) 25 MG tablet Take 1 tablet by mouth Every 6 (Six) Hours As Needed for Nausea or Vomiting. 10/2/18  Yes Jorge Lugo MD   raNITIdine (ZANTAC) 150 MG tablet Take 150 mg by mouth As Needed. 9/28/18  Yes Roxanna Barnes MD   SUBOXONE 8-2 MG film film Take 8.2 mg by mouth 3 (Three) Times a Day. 9/29/17  Yes Roxanna Barnes MD   tiZANidine (ZANAFLEX) 4 MG tablet TAKE 1 TABLET BY MOUTH TWICE DAILY AS NEEDED 3/11/17  Yes Roxanna Barnes MD   traZODone (DESYREL) 50 MG tablet  3/12/18  Yes Roxanna Barnes MD   triamcinolone (KENALOG) 0.1 % cream Apply 1 application topically to the appropriate area as directed As Needed. 9/27/18  Yes Roxanna Barnes MD   dicyclomine (BENTYL) 10 MG capsule TAKE 1 CAPSULE BY MOUTH THREE TIMES DAILY 4/6/17 10/2/18 Yes Roxanna Barnes MD   esomeprazole (nexIUM) 40 MG capsule Take 40 mg by mouth Every Morning Before Breakfast.  10/2/18 Yes Roxanna Barnes MD   promethazine (PHENERGAN) 25 MG tablet TAKE 1 TABLET BY MOUTH EVERY 6 (SIX) HOURS AS NEEDED FOR NAUSEA OR VOMITING. 9/28/18 10/2/18 Yes Jorge Lugo MD       Allergies:  Codeine; Iodine; Keflex [cephalexin]; and Sudafed [pseudoephedrine hcl]    ROS:    Review of Systems   Constitutional: Negative for activity change, appetite change, chills, diaphoresis, fatigue, fever and  "unexpected weight change.   HENT: Negative for sore throat and trouble swallowing.    Respiratory: Negative for shortness of breath.    Gastrointestinal: Positive for abdominal pain. Negative for abdominal distention, anal bleeding, blood in stool, constipation, diarrhea, nausea, rectal pain and vomiting.   Musculoskeletal: Negative for arthralgias.   Skin: Negative for pallor.   Neurological: Negative for light-headedness.     Objective     Blood pressure 100/60, pulse 94, height 167.6 cm (66\"), weight 48.2 kg (106 lb 3.2 oz), SpO2 95 %.    Physical Exam   Constitutional: She is oriented to person, place, and time. She appears well-developed and well-nourished. No distress.   HENT:   Head: Normocephalic and atraumatic.   Cardiovascular: Normal rate, regular rhythm, normal heart sounds and intact distal pulses.  Exam reveals no gallop and no friction rub.    No murmur heard.  Pulmonary/Chest: Breath sounds normal. No respiratory distress. She has no wheezes. She has no rales. She exhibits no tenderness.   Abdominal: Soft. Bowel sounds are normal. She exhibits no distension and no mass. There is tenderness. There is no rebound and no guarding. No hernia.   Prominent swollen liver in right upper quadrant   Musculoskeletal: Normal range of motion. She exhibits no edema.   Neurological: She is alert and oriented to person, place, and time.   Skin: Skin is warm and dry. No rash noted. She is not diaphoretic. No erythema. No pallor.   Psychiatric: She has a normal mood and affect. Her behavior is normal. Judgment and thought content normal.        Assessment/Plan   Guillermina was seen today for cirrhosis and follow-up.    Diagnoses and all orders for this visit:    Fall against object  -     Hospital Bed  -     CT Abdomen Pelvis With Contrast; Future  -     Comprehensive Metabolic Panel; Future  -     CBC & Differential; Future  -     Hepatitis C RNA, Quantitative, PCR (graph); Future  -     AFP Tumor Marker; Future    Right " upper quadrant abdominal pain  -     CT Abdomen Pelvis With Contrast; Future  -     Comprehensive Metabolic Panel; Future  -     CBC & Differential; Future  -     Hepatitis C RNA, Quantitative, PCR (graph); Future  -     AFP Tumor Marker; Future    Abnormal findings on diagnostic imaging of liver and biliary tract   -     AFP Tumor Marker; Future    Nausea  -     promethazine (PHENERGAN) 25 MG tablet; Take 1 tablet by mouth Every 6 (Six) Hours As Needed for Nausea or Vomiting.    Other orders  -     dicyclomine (BENTYL) 10 MG capsule; Take 1 capsule by mouth 3 (Three) Times a Day.  -     esomeprazole (nexIUM) 40 MG capsule; Take 1 capsule by mouth Every Morning Before Breakfast.        * Surgery not found *     Diagnosis Plan   1. Fall against object  Hospital Bed    CT Abdomen Pelvis With Contrast    Comprehensive Metabolic Panel    CBC & Differential    Hepatitis C RNA, Quantitative, PCR (graph)    AFP Tumor Marker   2. Right upper quadrant abdominal pain  CT Abdomen Pelvis With Contrast    Comprehensive Metabolic Panel    CBC & Differential    Hepatitis C RNA, Quantitative, PCR (graph)    AFP Tumor Marker   3. Abnormal findings on diagnostic imaging of liver and biliary tract   AFP Tumor Marker   4. Nausea  promethazine (PHENERGAN) 25 MG tablet       Anticipated Surgical Procedure:  Orders Placed This Encounter   Procedures   • Hospital Bed     Order Specific Question:   Equipment:     Answer:    Hospital Bed, Semi-Electirc w/ Mattress & w/ Rails     Order Specific Question:   Length of Need (99 Months = Lifetime)     Answer:   99 Months = Lifetime   • CT Abdomen Pelvis With Contrast     Standing Status:   Future     Standing Expiration Date:   10/2/2019     Scheduling Instructions:      Possible fracture of rib     Order Specific Question:   Will Oral Contrast be needed for this procedure?     Answer:   Yes   • Comprehensive Metabolic Panel     Standing Status:   Future     Standing Expiration Date:    10/2/2019   • Hepatitis C RNA, Quantitative, PCR (graph)     Standing Status:   Future     Standing Expiration Date:   10/2/2019   • AFP Tumor Marker     Standing Status:   Future     Standing Expiration Date:   10/2/2019   • CBC & Differential     Standing Status:   Future     Standing Expiration Date:   10/2/2019     Order Specific Question:   Manual Differential     Answer:   No       The risks, benefits, and alternatives of this procedure have been discussed with the patient or the responsible party- the patient understands and agrees to proceed.

## 2018-10-02 NOTE — PATIENT INSTRUCTIONS
MyPlate from [employer]  The general, healthful diet is based on the 2010 Dietary Guidelines for Americans. The amount of food you need to eat from each food group depends on your age, sex, and level of physical activity and can be individualized by a dietitian. Go to ChooseMyPlate.gov for more information.  What do I need to know about the MyPlate plan?  · Enjoy your food, but eat less.  · Avoid oversized portions.  ? ½ of your plate should include fruits and vegetables.  ? ¼ of your plate should be grains.  ? ¼ of your plate should be protein.  Grains  · Make at least half of your grains whole grains.  · For a 2,000 calorie daily food plan, eat 6 oz every day.  · 1 oz is about 1 slice bread, 1 cup cereal, or ½ cup cooked rice, cereal, or pasta.  Vegetables  · Make half your plate fruits and vegetables.  · For a 2,000 calorie daily food plan, eat 2½ cups every day.  · 1 cup is about 1 cup raw or cooked vegetables or vegetable juice or 2 cups raw leafy greens.  Fruits  · Make half your plate fruits and vegetables.  · For a 2,000 calorie daily food plan, eat 2 cups every day.  · 1 cup is about 1 cup fruit or 100% fruit juice or ½ cup dried fruit.  Protein  · For a 2,000 calorie daily food plan, eat 5½ oz every day.  · 1 oz is about 1 oz meat, poultry, or fish, ¼ cup cooked beans, 1 egg, 1 Tbsp peanut butter, or ½ oz nuts or seeds.  Dairy  · Switch to fat-free or low-fat (1%) milk.  · For a 2,000 calorie daily food plan, eat 3 cups every day.  · 1 cup is about 1 cup milk or yogurt or soy milk (soy beverage), 1½ oz natural cheese, or 2 oz processed cheese.  Fats, Oils, and Empty Calories  · Only small amounts of oils are recommended.  · Empty calories are calories from solid fats or added sugars.  · Compare sodium in foods like soup, bread, and frozen meals. Choose the foods with lower numbers.  · Drink water instead of sugary drinks.  What foods can I eat?  Grains  Whole grains such as whole wheat, quinoa, millet, and  bulgur. Bread, rolls, and pasta made from whole grains. Brown or wild rice. Hot or cold cereals made from whole grains and without added sugar.  Vegetables  All fresh vegetables, especially fresh red, dark green, or orange vegetables. Peas and beans. Low-sodium frozen or canned vegetables prepared without added salt. Low-sodium vegetable juices.  Fruits  All fresh, frozen, and dried fruits. Canned fruit packed in water or fruit juice without added sugar. Fruit juices without added sugar.  Meats and Other Protein Sources  Boiled, baked, or grilled lean meat trimmed of fat. Skinless poultry. Fresh seafood and shellfish. Canned seafood packed in water. Unsalted nuts and unsalted nut butters. Tofu. Dried beans and pea. Eggs.  Dairy  Low-fat or fat-free milk, yogurt, and cheeses.  Sweets and Desserts  Frozen desserts made from low-fat milk.  Fats and Oils  Olive, peanut, and canola oils and margarine. Salad dressing and mayonnaise made from these oils.  Other  Soups and casseroles made from allowed ingredients and without added fat or salt.  The items listed above may not be a complete list of recommended foods or beverages. Contact your dietitian for more options.  What foods are not recommended?  Grains  Sweetened, low-fiber cereals. Packaged baked goods. Snack crackers and chips. Cheese crackers, butter crackers, and biscuits. Frozen waffles, sweet breads, doughnuts, pastries, packaged baking mixes, pancakes, cakes, and cookies.  Vegetables  Regular canned or frozen vegetables or vegetables prepared with salt. Canned tomatoes. Canned tomato sauce. Fried vegetables. Vegetables in cream sauce or cheese sauce.  Fruits  Fruits packed in syrup or made with added sugar.  Meats and Other Protein Sources  Marbled or fatty meats such as ribs. Poultry with skin. Fried meats, poultry, eggs, or fish. Sausages, hot dogs, and deli meats such as pastrami, bologna, or salami.  Dairy  Whole milk, cream, cheeses made from whole milk,  sour cream. Ice cream or yogurt made from whole milk or with added sugar.  Beverages  For adults, no more than one alcoholic drink per day. Regular soft drinks or other sugary beverages. Juice drinks.  Sweets and Desserts  Sugary or fatty desserts, candy, and other sweets.  Fats and Oils  Solid shortening or partially hydrogenated oils. Solid margarine. Margarine that contains trans fats. Butter.  The items listed above may not be a complete list of foods and beverages to avoid. Contact your dietitian for more information.  This information is not intended to replace advice given to you by your health care provider. Make sure you discuss any questions you have with your health care provider.  Document Released: 01/06/2009 Document Revised: 05/25/2017 Document Reviewed: 11/26/2014  [employer] Interactive Patient Education © 2018 [employer]  BMI for Adults  Body mass index (BMI) is a number that is calculated from a person's weight and height. In most adults, the number is used to find how much of an adult's weight is made up of fat. BMI is not as accurate as a direct measure of body fat.  How is BMI calculated?  BMI is calculated by dividing weight in kilograms by height in meters squared. It can also be calculated by dividing weight in pounds by height in inches squared, then multiplying the resulting number by 703. Charts are available to help you find your BMI quickly and easily without doing this calculation.  How is BMI interpreted?  Health care professionals use BMI charts to identify whether an adult is underweight, at a normal weight, or overweight based on the following guidelines:  · Underweight: BMI less than 18.5.  · Normal weight: BMI between 18.5 and 24.9.  · Overweight: BMI between 25 and 29.9.  · Obese: BMI of 30 and above.    BMI is usually interpreted the same for males and females.  Weight includes both fat and muscle, so someone with a muscular build, such as an athlete, may have a BMI that is  higher than 24.9. In cases like these, BMI may not accurately depict body fat. To determine if excess body fat is the cause of a BMI of 25 or higher, further assessments may need to be done by a health care provider.  Why is BMI a useful tool?  BMI is used to identify a possible weight problem that may be related to a medical problem or may increase the risk for medical problems. BMI can also be used to promote changes to reach a healthy weight.  This information is not intended to replace advice given to you by your health care provider. Make sure you discuss any questions you have with your health care provider.  Document Released: 08/29/2005 Document Revised: 04/27/2017 Document Reviewed: 05/15/2015  [employer] Interactive Patient Education © 2018 [employer]

## 2018-11-02 NOTE — TELEPHONE ENCOUNTER
11/02/2018, 1207 - Patient telephoned per this staff member [phone].  Zero answer.  Voice message submitted with date, time, office contact information, and notification patient is in need of promptly completing laboratory testing and CT Abdomen Pelvis With Contrast to ensure results available for review with patient during clinical appointment scheduled with Dr. Jorge Arreaga M.D. Tuesday, November 6, 2018 at 10:00 a.m.  Patient instructed to contact office if she is unable to attend clinical appointment.

## 2018-11-06 NOTE — ANESTHESIA POSTPROCEDURE EVALUATION
Patient: Guillermina Noble    Procedure Summary     Date:  11/06/18 Room / Location:  [facility] ENDOSCOPY 4 / [facility] ENDOSCOPY    Anesthesia Start:  1627 Anesthesia Stop:  1634    Procedure:  ESOPHAGOGASTRODUODENOSCOPY (N/A ) Diagnosis:       Epigastric pain      (Epigastric pain [R10.13])    Surgeon:  Jorge Lugo MD Provider:  Adilene Jiang CRNA    Anesthesia Type:  MAC ASA Status:  3          Anesthesia Type: MAC  Last vitals  BP   112/76 (11/06/18 1514)   Temp   99.6 °F (37.6 °C) (11/06/18 1514)   Pulse   114 (11/06/18 1514)   Resp   20 (11/06/18 1514)     SpO2   97 % (11/06/18 1514)     Post Anesthesia Care and Evaluation    Patient location during evaluation: bedside  Patient participation: complete - patient participated  Level of consciousness: awake and awake and alert  Pain management: satisfactory to patient  Airway patency: patent  Anesthetic complications: No anesthetic complications    Cardiovascular status: acceptable and stable  Respiratory status: acceptable, room air and spontaneous ventilation  Hydration status: acceptable

## 2018-11-06 NOTE — ANESTHESIA PREPROCEDURE EVALUATION
Anesthesia Evaluation     Patient summary reviewed and Nursing notes reviewed   NPO Solid Status: > 8 hours  NPO Liquid Status: > 6 hours           Airway   Mallampati: II  TM distance: >3 FB  Neck ROM: full  no difficulty expected  Dental    (+) upper dentures and lower dentures    Pulmonary - normal exam   (+) a smoker Current Smoked day of surgery, COPD, asthma,   Cardiovascular - normal exam    (+) CAD,       Neuro/Psych  (+) seizures,     GI/Hepatic/Renal/Endo    (+)  GERD,  hepatitis C, liver disease,     Musculoskeletal (-) negative ROS    Abdominal  - normal exam   Substance History - negative use     OB/GYN negative ob/gyn ROS   (-)  Pregnant        Other - negative ROS                         Anesthesia Plan    ASA 3     MAC     intravenous induction   Anesthetic plan, all risks, benefits, and alternatives have been provided, discussed and informed consent has been obtained with: patient.

## 2018-11-06 NOTE — PERIOPERATIVE NURSING NOTE
Patient educated to stop taking her Klonopin due to interaction with newly prescribed Diflucan. Patient states that she understands the importance of stopping her Klonopin.

## 2018-11-06 NOTE — PATIENT INSTRUCTIONS

## 2018-11-06 NOTE — H&P (VIEW-ONLY)
[facility]      Chief Complaint:   Chief Complaint   Patient presents with   • Fall Against Objects       Subjective     HPI:   Patient with severe epigastric abdominal pain and significant weight loss of 30 pounds.  Patient refuses to eat stating food does not taste good.  Patient also is refusing to go get lab work CAT scan of the abdomen or endoscopic evaluation of her stomach.  Patient understands that she is going to die if she continues to not eat.  Patient denies any nausea or vomiting.  Patient's weight loss is been over the past 6 months.  Patient's family is very concerned about her stating that even her favorite food she will not eat.    Plan; the patient or EGD to be done today to evaluate for any mechanical causes of patient's unwillingness to eat.  Also schedule patient for CT scan of the abdomen and pelvis and lab work will patient follow up in 2 weeks.      Past Medical History:   Past Medical History:   Diagnosis Date   • Asthma    • COPD (chronic obstructive pulmonary disease) (CMS/HCC)    • Coronary artery disease    • Emphysema of lung (CMS/HCC)    • Emphysema/COPD (CMS/HCC)    • Multiple allergies        Family History:  Family History   Problem Relation Age of Onset   • Lung disease Mother    • Lung cancer Father    • Diabetes Sister    • Cirrhosis Brother    • Liver disease Brother    • Heart disease Maternal Grandmother    • Heart disease Maternal Grandfather    • Heart disease Paternal Grandmother    • Heart disease Paternal Grandfather        Social History:   reports that she has been smoking Cigarettes.  She has a 24.00 pack-year smoking history. She has never used smokeless tobacco. She reports that she does not drink alcohol or use drugs.    Medications:   Prior to Admission medications    Medication Sig Start Date End Date Taking? Authorizing Provider   citalopram (CeleXA) 20 MG tablet TAKE 1 TABLET BY MOUTH DAILY 12/11/17  Yes Provider, MD Roxanna    clonazePAM (KlonoPIN) 1 MG tablet TAKE 2 TO 3 TABLETS BY MOUTH EVERY DAY AS NEEDED 3/8/18  Yes Roxanna Barnes MD   dicyclomine (BENTYL) 10 MG capsule Take 1 capsule by mouth 3 (Three) Times a Day. 10/2/18  Yes Jorge Lugo MD   esomeprazole (nexIUM) 40 MG capsule Take 1 capsule by mouth Every Morning Before Breakfast. 10/2/18  Yes Jorge Lugo MD   furosemide (LASIX) 20 MG tablet TAKE 1 TABLET BY MOUTH DAILY 11/29/17  Yes Roxanna Barnes MD   gabapentin (NEURONTIN) 800 MG tablet Take 800 mg by mouth 3 (Three) Times a Day.   Yes Roxanna Barnes MD   ondansetron ODT (ZOFRAN-ODT) 4 MG disintegrating tablet LET 1 TABLET DISSOLVE ON THE TONGUE EVERY 8 HOURS AS NEEDED FOR NAUSEA FOR 15 DAYS 12/6/17  Yes Roxanna Barnes MD   promethazine (PHENERGAN) 25 MG tablet TAKE 1 TABLET BY MOUTH EVERY 6 (SIX) HOURS AS NEEDED FOR NAUSEA OR VOMITING. 11/2/18  Yes Jorge Lugo MD   raNITIdine (ZANTAC) 150 MG tablet Take 150 mg by mouth As Needed. 9/28/18  Yes Roxanna Barnes MD   SUBOXONE 8-2 MG film film Take 8.2 mg by mouth 3 (Three) Times a Day. 9/29/17  Yes Roxanna Barnes MD   tiZANidine (ZANAFLEX) 4 MG tablet TAKE 1 TABLET BY MOUTH TWICE DAILY AS NEEDED 3/11/17  Yes Roxanna Barnes MD   traZODone (DESYREL) 50 MG tablet  3/12/18  Yes Roxanna Barnes MD   triamcinolone (KENALOG) 0.1 % cream Apply 1 application topically to the appropriate area as directed As Needed. 9/27/18  Yes Roxanna Barnes MD       Allergies:  Codeine; Iodine; Keflex [cephalexin]; and Sudafed [pseudoephedrine hcl]    ROS:    Review of Systems   Constitutional: Positive for unexpected weight change. Negative for activity change, appetite change, chills, diaphoresis, fatigue and fever.   HENT: Negative for sore throat and trouble swallowing.    Respiratory: Negative for shortness of breath.    Gastrointestinal: Positive for abdominal pain. Negative for abdominal distention, anal bleeding, blood  "in stool, constipation, diarrhea, nausea, rectal pain and vomiting.   Endocrine: Negative for polydipsia, polyphagia and polyuria.   Genitourinary: Negative for difficulty urinating.   Musculoskeletal: Negative for arthralgias.   Skin: Negative for pallor.   Allergic/Immunologic: Negative for food allergies.   Neurological: Positive for light-headedness. Negative for weakness.   Psychiatric/Behavioral: Positive for agitation and behavioral problems.     Objective     Blood pressure 104/72, pulse 116, height 167.6 cm (66\"), weight 42.4 kg (93 lb 6.4 oz), SpO2 99 %.    Physical Exam   Constitutional: She is oriented to person, place, and time. She appears well-developed and well-nourished. No distress.   HENT:   Head: Normocephalic and atraumatic.   Cardiovascular: Normal rate, regular rhythm, normal heart sounds and intact distal pulses.  Exam reveals no gallop and no friction rub.    No murmur heard.  Pulmonary/Chest: Breath sounds normal. No respiratory distress. She has no wheezes. She has no rales. She exhibits no tenderness.   Abdominal: Soft. Bowel sounds are normal. She exhibits no distension and no mass. There is tenderness. There is no rebound and no guarding. No hernia.   Musculoskeletal: Normal range of motion. She exhibits no edema.   Neurological: She is alert and oriented to person, place, and time.   Skin: Skin is warm and dry. No rash noted. She is not diaphoretic. No erythema. No pallor.   Psychiatric: She has a normal mood and affect. Her behavior is normal. Judgment and thought content normal.        Assessment/Plan   Guillermina was seen today for fall against objects.    Diagnoses and all orders for this visit:    Epigastric pain  -     Case Request; Standing  -     dextrose 5 % and sodium chloride 0.45 % infusion; Infuse 30 mL/hr into a venous catheter Continuous As Needed (Start Prior to Procedure).  -     Case Request    Other orders  -     Follow Anesthesia Guidelines / Standing Orders; Future  -    "  Implement Anesthesia Orders Day of Procedure; Standing  -     Obtain Informed Consent; Standing  -     POC Glucose Once; Standing        ESOPHAGOGASTRODUODENOSCOPY (N/A)     Diagnosis Plan   1. Epigastric pain  Case Request    dextrose 5 % and sodium chloride 0.45 % infusion    Case Request       Anticipated Surgical Procedure:  Orders Placed This Encounter   Procedures   • Follow Anesthesia Guidelines / Standing Orders     Standing Status:   Future       The risks, benefits, and alternatives of this procedure have been discussed with the patient or the responsible party- the patient understands and agrees to proceed.

## 2018-11-06 NOTE — PROGRESS NOTES
[facility]      Chief Complaint:   Chief Complaint   Patient presents with   • Fall Against Objects       Subjective     HPI:   Patient with severe epigastric abdominal pain and significant weight loss of 30 pounds.  Patient refuses to eat stating food does not taste good.  Patient also is refusing to go get lab work CAT scan of the abdomen or endoscopic evaluation of her stomach.  Patient understands that she is going to die if she continues to not eat.  Patient denies any nausea or vomiting.  Patient's weight loss is been over the past 6 months.  Patient's family is very concerned about her stating that even her favorite food she will not eat.    Plan; the patient or EGD to be done today to evaluate for any mechanical causes of patient's unwillingness to eat.  Also schedule patient for CT scan of the abdomen and pelvis and lab work will patient follow up in 2 weeks.      Past Medical History:   Past Medical History:   Diagnosis Date   • Asthma    • COPD (chronic obstructive pulmonary disease) (CMS/HCC)    • Coronary artery disease    • Emphysema of lung (CMS/HCC)    • Emphysema/COPD (CMS/HCC)    • Multiple allergies        Family History:  Family History   Problem Relation Age of Onset   • Lung disease Mother    • Lung cancer Father    • Diabetes Sister    • Cirrhosis Brother    • Liver disease Brother    • Heart disease Maternal Grandmother    • Heart disease Maternal Grandfather    • Heart disease Paternal Grandmother    • Heart disease Paternal Grandfather        Social History:   reports that she has been smoking Cigarettes.  She has a 24.00 pack-year smoking history. She has never used smokeless tobacco. She reports that she does not drink alcohol or use drugs.    Medications:   Prior to Admission medications    Medication Sig Start Date End Date Taking? Authorizing Provider   citalopram (CeleXA) 20 MG tablet TAKE 1 TABLET BY MOUTH DAILY 12/11/17  Yes Provider, MD Roxanna    clonazePAM (KlonoPIN) 1 MG tablet TAKE 2 TO 3 TABLETS BY MOUTH EVERY DAY AS NEEDED 3/8/18  Yes Roxanna Barnes MD   dicyclomine (BENTYL) 10 MG capsule Take 1 capsule by mouth 3 (Three) Times a Day. 10/2/18  Yes Jorge Lugo MD   esomeprazole (nexIUM) 40 MG capsule Take 1 capsule by mouth Every Morning Before Breakfast. 10/2/18  Yes Jorge Lugo MD   furosemide (LASIX) 20 MG tablet TAKE 1 TABLET BY MOUTH DAILY 11/29/17  Yes Roxanna Barnes MD   gabapentin (NEURONTIN) 800 MG tablet Take 800 mg by mouth 3 (Three) Times a Day.   Yes Roxanna Barnes MD   ondansetron ODT (ZOFRAN-ODT) 4 MG disintegrating tablet LET 1 TABLET DISSOLVE ON THE TONGUE EVERY 8 HOURS AS NEEDED FOR NAUSEA FOR 15 DAYS 12/6/17  Yes Roxanna Barnes MD   promethazine (PHENERGAN) 25 MG tablet TAKE 1 TABLET BY MOUTH EVERY 6 (SIX) HOURS AS NEEDED FOR NAUSEA OR VOMITING. 11/2/18  Yes Jorge Lugo MD   raNITIdine (ZANTAC) 150 MG tablet Take 150 mg by mouth As Needed. 9/28/18  Yes Roxanna Barnes MD   SUBOXONE 8-2 MG film film Take 8.2 mg by mouth 3 (Three) Times a Day. 9/29/17  Yes Roxanna Barnes MD   tiZANidine (ZANAFLEX) 4 MG tablet TAKE 1 TABLET BY MOUTH TWICE DAILY AS NEEDED 3/11/17  Yes Roxanna Barnes MD   traZODone (DESYREL) 50 MG tablet  3/12/18  Yes Roxanna Barnes MD   triamcinolone (KENALOG) 0.1 % cream Apply 1 application topically to the appropriate area as directed As Needed. 9/27/18  Yes Roxanna Barnes MD       Allergies:  Codeine; Iodine; Keflex [cephalexin]; and Sudafed [pseudoephedrine hcl]    ROS:    Review of Systems   Constitutional: Positive for unexpected weight change. Negative for activity change, appetite change, chills, diaphoresis, fatigue and fever.   HENT: Negative for sore throat and trouble swallowing.    Respiratory: Negative for shortness of breath.    Gastrointestinal: Positive for abdominal pain. Negative for abdominal distention, anal bleeding, blood  "in stool, constipation, diarrhea, nausea, rectal pain and vomiting.   Endocrine: Negative for polydipsia, polyphagia and polyuria.   Genitourinary: Negative for difficulty urinating.   Musculoskeletal: Negative for arthralgias.   Skin: Negative for pallor.   Allergic/Immunologic: Negative for food allergies.   Neurological: Positive for light-headedness. Negative for weakness.   Psychiatric/Behavioral: Positive for agitation and behavioral problems.     Objective     Blood pressure 104/72, pulse 116, height 167.6 cm (66\"), weight 42.4 kg (93 lb 6.4 oz), SpO2 99 %.    Physical Exam   Constitutional: She is oriented to person, place, and time. She appears well-developed and well-nourished. No distress.   HENT:   Head: Normocephalic and atraumatic.   Cardiovascular: Normal rate, regular rhythm, normal heart sounds and intact distal pulses.  Exam reveals no gallop and no friction rub.    No murmur heard.  Pulmonary/Chest: Breath sounds normal. No respiratory distress. She has no wheezes. She has no rales. She exhibits no tenderness.   Abdominal: Soft. Bowel sounds are normal. She exhibits no distension and no mass. There is tenderness. There is no rebound and no guarding. No hernia.   Musculoskeletal: Normal range of motion. She exhibits no edema.   Neurological: She is alert and oriented to person, place, and time.   Skin: Skin is warm and dry. No rash noted. She is not diaphoretic. No erythema. No pallor.   Psychiatric: She has a normal mood and affect. Her behavior is normal. Judgment and thought content normal.        Assessment/Plan   Guillermina was seen today for fall against objects.    Diagnoses and all orders for this visit:    Epigastric pain  -     Case Request; Standing  -     dextrose 5 % and sodium chloride 0.45 % infusion; Infuse 30 mL/hr into a venous catheter Continuous As Needed (Start Prior to Procedure).  -     Case Request    Other orders  -     Follow Anesthesia Guidelines / Standing Orders; Future  -    "  Implement Anesthesia Orders Day of Procedure; Standing  -     Obtain Informed Consent; Standing  -     POC Glucose Once; Standing        ESOPHAGOGASTRODUODENOSCOPY (N/A)     Diagnosis Plan   1. Epigastric pain  Case Request    dextrose 5 % and sodium chloride 0.45 % infusion    Case Request       Anticipated Surgical Procedure:  Orders Placed This Encounter   Procedures   • Follow Anesthesia Guidelines / Standing Orders     Standing Status:   Future       The risks, benefits, and alternatives of this procedure have been discussed with the patient or the responsible party- the patient understands and agrees to proceed.

## 2018-11-19 NOTE — TELEPHONE ENCOUNTER
11/19/2018, 0818 - Patient telephoned per this staff member [phone].  Zero answer.  Voice message submitted with date, time, office contact information, reminder of clinical appointment scheduled with Dr. Jorge Arreaga M.D. Tuesday, November 20, 2018 at 9:30 a.m., and notification of patient need to promptly complete laboratory testing.

## 2018-11-20 PROBLEM — R10.9 INTRACTABLE ABDOMINAL PAIN: Status: ACTIVE | Noted: 2018-01-01

## 2018-11-20 PROBLEM — E86.0 DEHYDRATION: Status: ACTIVE | Noted: 2018-01-01

## 2018-11-20 PROBLEM — R11.2 INTRACTABLE NAUSEA AND VOMITING: Status: ACTIVE | Noted: 2018-01-01

## 2018-11-20 PROBLEM — F11.10 OPIOID ABUSE (HCC): Status: ACTIVE | Noted: 2018-01-01

## 2018-11-20 PROBLEM — F32.9 MAJOR DEPRESSIVE DISORDER: Status: ACTIVE | Noted: 2018-01-01

## 2018-11-20 PROBLEM — R10.84 GENERALIZED ABDOMINAL PAIN: Status: ACTIVE | Noted: 2018-01-01

## 2018-11-20 PROBLEM — G40.909 SEIZURE DISORDER (HCC): Status: ACTIVE | Noted: 2018-01-01

## 2018-11-20 PROBLEM — K70.31 ASCITES DUE TO ALCOHOLIC CIRRHOSIS (HCC): Status: ACTIVE | Noted: 2018-01-01

## 2018-11-23 PROBLEM — K65.2 SPONTANEOUS BACTERIAL PERITONITIS (HCC): Status: ACTIVE | Noted: 2018-01-01

## 2018-11-23 PROBLEM — D49.89 NEOPLASM OF ABDOMEN: Status: ACTIVE | Noted: 2018-01-01

## 2018-11-23 PROBLEM — I95.9 HYPOTENSION: Status: ACTIVE | Noted: 2018-01-01

## 2018-11-23 PROBLEM — R64 CACHEXIA (HCC): Status: ACTIVE | Noted: 2018-01-01

## 2018-11-26 PROBLEM — F33.9 EPISODE OF RECURRENT MAJOR DEPRESSIVE DISORDER (HCC): Status: ACTIVE | Noted: 2018-01-01

## 2018-11-26 NOTE — ANESTHESIA PREPROCEDURE EVALUATION
Anesthesia Evaluation     no history of anesthetic complications:  NPO Solid Status: > 8 hours  NPO Liquid Status: > 8 hours           Airway   Mallampati: II  TM distance: <3 FB  Neck ROM: full  No difficulty expected  Dental - normal exam     Pulmonary - normal exam   (+) a smoker Current, COPD, asthma,   Cardiovascular - normal exam    (+) CAD,       Neuro/Psych  (+) seizures, psychiatric history Anxiety and Depression,     GI/Hepatic/Renal/Endo    (+)  hiatal hernia, GERD,  hepatitis C, liver disease,     Musculoskeletal     Abdominal    Substance History      OB/GYN          Other                        Anesthesia Plan    ASA 3     MAC     intravenous induction   Anesthetic plan, all risks, benefits, and alternatives have been provided, discussed and informed consent has been obtained with: patient.

## 2018-11-26 NOTE — ANESTHESIA POSTPROCEDURE EVALUATION
Patient: Guillermina Noble    Procedure Summary     Date:  11/26/18 Room / Location:  [facility] ENDOSCOPY 1 / [facility] ENDOSCOPY    Anesthesia Start:  1545 Anesthesia Stop:  1554    Procedure:  SIGMOIDOSCOPY FLEXIBLE (N/A ) Diagnosis:       Episode of recurrent major depressive disorder, unspecified depression episode severity (CMS/HCC)      Abnormal findings on diagnostic imaging of liver and biliary tract      (Episode of recurrent major depressive disorder, unspecified depression episode severity (CMS/HCC) [F33.9])      (Abnormal findings on diagnostic imaging of liver and biliary tract [R93.2])    Surgeon:  Jorge Lugo MD Provider:  Aarti Butler CRNA    Anesthesia Type:  MAC ASA Status:  3          Anesthesia Type: MAC  Last vitals  BP   111/77 (11/26/18 1525)   Temp   97.9 °F (36.6 °C) (11/26/18 1458)   Pulse   89 (11/26/18 1525)   Resp   18 (11/26/18 1525)     SpO2   98 % (11/26/18 1525)     Post Anesthesia Care and Evaluation    Patient location during evaluation: bedside  Patient participation: complete - patient participated  Level of consciousness: awake and alert  Pain management: adequate  Airway patency: patent  Anesthetic complications: No anesthetic complications  PONV Status: none  Cardiovascular status: acceptable  Respiratory status: acceptable  Hydration status: acceptable

## 2018-11-30 NOTE — PROGRESS NOTES
Oncology SW encounter.  Medical oncology new patient.  Hospice referral.  LCSW advised by Dr. Red of patient new diagnosis of metastatic  hepatocellular carcinoma.  Pt. Discharged from acute care day prior. Physician has discussed pathology findings and recommendations with patient and pt opts to proceed with home hospice care.  Oncology SW contacted patient by phone to offer emotional support, education as to home care options and ongoing support. Pt. Chose [facility] hospice and would like contact this day as she is experiencing pain and in need of DME.  SW spoke with Sarah at [facility] hospice and completed referral. Multiple attempts by undersigned to contact pt PCP, Kaycee Hicks MD, and advise of plan. No answer at PCP. Faxing pertinent info to office for supportive collaborative care.  Pt. Has apt with APRN on Wed and can be cancelled if she does admit to hospice prior.

## 2018-11-30 NOTE — OUTREACH NOTE
Prep Survey      Responses   Facility patient discharged from?  [facility]   Is patient eligible?  Yes   Discharge diagnosis  Neoplasm of abdomen, cachexia, Hypotension, Spontaneous bacterial peritonitis, intractable N/V, Generalized abdominal pain, Ascites due to alcoholic cirrhosis, Major depressive disorder, opioid abuse, seizure disorder, dehydration,abnormal findings on diagnostic imaging of liver and biliary tract, GERD, chronic Hep C without hepatic coma, s/p paracentesis    Does the patient have one of the following disease processes/diagnoses(primary or secondary)?  Other   Does the patient have Home health ordered?  No   Is there a DME ordered?  No   What DME was ordered?  Pt has cane and walker   Comments regarding appointments  See AVS   Prep survey completed?  Yes          Zonia Hermosillo RN

## 2018-12-03 NOTE — OUTREACH NOTE
Medical Week 1 Survey      Responses   Facility patient discharged from?  [facility]   Does the patient have one of the following disease processes/diagnoses(primary or secondary)?  Other   Is there a successful TCM telephone encounter documented?  No   Week 1 attempt successful?  No   Unsuccessful attempts  Attempt 1          Betsy Tolbert RN

## 2018-12-04 NOTE — TELEPHONE ENCOUNTER
[facility] patient, fax to [phone]. RX#[phone] and [phone].

## 2018-12-05 NOTE — OUTREACH NOTE
Medical Week 1 Survey      Responses   Facility patient discharged from?  [facility]   Does the patient have one of the following disease processes/diagnoses(primary or secondary)?  Other   Is there a successful TCM telephone encounter documented?  No   Week 1 attempt successful?  Yes   Call start time  1623   Rescheduled  Rescheduled-pt requested   Call end time  1623   Discharge diagnosis  Neoplasm of abdomen, cachexia, Hypotension, Spontaneous bacterial peritonitis, intractable N/V, Generalized abdominal pain, Ascites due to alcoholic cirrhosis, Major depressive disorder, opioid abuse, seizure disorder, dehydration,abnormal findings on diagnostic imaging of liver and biliary tract, GERD, chronic Hep C without hepatic coma, s/p paracentesis           Justyna Jaffe RN

## 2018-12-06 NOTE — OUTREACH NOTE
Medical Week 1 Survey      Responses   Facility patient discharged from?  [facility]   Does the patient have one of the following disease processes/diagnoses(primary or secondary)?  Other   Is there a successful TCM telephone encounter documented?  No   Week 1 attempt successful?  Yes   Call start time  1246   Rescheduled  Revoked [Unavailable for call. Revoked, 2nd request to reschedule]          Laurie Barnett RN

## (undated) DEVICE — BITEBLOCK ENDO W/STRAP 60F A/ LF DISP

## (undated) DEVICE — SINGLE-USE BIOPSY FORCEPS: Brand: RADIAL JAW 4

## (undated) DEVICE — CANN SMPL SOFTECH BIFLO ETCO2 A/M 7FT

## (undated) DEVICE — CONMED COLONOSCOPE SHEATHED CYTOLOGY BRUSH, RING HANDLE, 3 MM X 230 CM: Brand: CONMED